# Patient Record
Sex: MALE | Race: WHITE | Employment: FULL TIME | URBAN - METROPOLITAN AREA
[De-identification: names, ages, dates, MRNs, and addresses within clinical notes are randomized per-mention and may not be internally consistent; named-entity substitution may affect disease eponyms.]

---

## 2017-01-05 ENCOUNTER — OFFICE VISIT (OUTPATIENT)
Dept: INTERNAL MEDICINE CLINIC | Age: 67
End: 2017-01-05

## 2017-01-05 VITALS
SYSTOLIC BLOOD PRESSURE: 165 MMHG | DIASTOLIC BLOOD PRESSURE: 102 MMHG | OXYGEN SATURATION: 98 % | WEIGHT: 183 LBS | BODY MASS INDEX: 28.72 KG/M2 | HEART RATE: 86 BPM | HEIGHT: 67 IN

## 2017-01-05 DIAGNOSIS — E16.2 HYPOGLYCEMIA: ICD-10-CM

## 2017-01-05 DIAGNOSIS — D64.9 ANEMIA, UNSPECIFIED TYPE: Primary | ICD-10-CM

## 2017-01-05 DIAGNOSIS — Z12.11 COLON CANCER SCREENING: ICD-10-CM

## 2017-01-05 DIAGNOSIS — R20.9 COLD EXTREMITIES: ICD-10-CM

## 2017-01-05 DIAGNOSIS — Z12.5 SPECIAL SCREENING FOR MALIGNANT NEOPLASM OF PROSTATE: ICD-10-CM

## 2017-01-05 DIAGNOSIS — R55 NEAR SYNCOPE: ICD-10-CM

## 2017-01-05 DIAGNOSIS — Z13.6 SCREENING FOR ISCHEMIC HEART DISEASE: ICD-10-CM

## 2017-01-05 NOTE — PROGRESS NOTES
Chief Complaint   [facility]   [facility]     Hospital Follow up, pt was admitted for low BP.  Medication Refill     metoprolol succinate, check for milligram     he is a 77y.o. year old male who presents for follow-up of ED visit, 12/26/2016-12/28/2016. Pt was admitted to ED for low BP. States that his numbers are elevated but has been feeling well. Is eating three meals a day now which he admits that he was not before. Reviewed and agree with Nurse Note and duplicated in this note. Reviewed PmHx, RxHx, FmHx, SocHx, AllgHx and updated and dated in the chart. No family history on file.     Past Medical History   Diagnosis Date    [facility] ([facility]     Heart failure ([facility]     Hypertension     Type II or unspecified type diabetes mellitus without mention of complication, not stated as uncontrolled     Wears hearing aid 4/2015      Social History     Social History    Marital status: SINGLE     Spouse name: N/A    Number of children: N/A    Years of education: N/A     Social History Main Topics    Smoking status: Former Smoker    Smokeless tobacco: Never Used    Alcohol use 10.5 oz/week     21 Glasses of wine per week    Drug use: Not on file    Sexual activity: Not on file     Other Topics Concern    Not on file     Social History Narrative        Review of Systems - negative except as listed above      Objective:     Vitals:    01/05/17 0934   BP: (!) 165/102   Pulse: 86   SpO2: 98%   Weight: 183 lb (83 kg)   Height: 5' 7\" (1.702 m)       Physical Examination: General appearance - alert, well appearing, and in no distress  Eyes - pupils equal and reactive, extraocular eye movements intact  Ears - bilateral TM's and external ear canals normal  Nose - normal and patent, no erythema, discharge or polyps  Mouth - mucous membranes moist, pharynx normal without lesions  Neck - supple, no significant adenopathy  Chest - clear to auscultation, no wheezes, rales or rhonchi, symmetric air entry  Heart - normal rate, regular rhythm, normal S1, S2, no murmurs, rubs, clicks or gallops  Abdomen - soft, nontender, nondistended, no masses or organomegaly  Neurological - alert, oriented, normal speech, no focal findings or movement disorder noted  Musculoskeletal - no joint tenderness, deformity or swelling  Extremities - peripheral pulses normal, no pedal edema, no clubbing or cyanosis  Skin - normal coloration and turgor, no rashes, no suspicious skin lesions noted    Assessment/ Plan: Lizet Dong was seen today for hypotension and medication refill. Diagnoses and all orders for this visit:    Near syncope  Recheck bp in two weeks for nurse visit  If elevated pt to schedule with cardiology. Routine general medical examination at a health care facility  -     [address] for alcoholism    Hypoglycemia  Continue on current regimen of metformin and adjust as needed in three months  Other specified hypotension    Screening for ischemic heart disease  -     Lipid Panel ([id number])    Special screening for malignant neoplasm of prostate  -     PSA Screening ([id number])    Cold extremities  -     TSH 3RD GENERATION       Follow-up Disposition:  Return in about 3 months (around 4/5/2017) for Diabetes Check, Blood Pressure Check. I have discussed the diagnosis with the patient and the intended plan as seen in the above orders. The patient has received an after-visit summary and questions were answered concerning future plans. Medication Side Effects and Warnings were discussed with patient: yes  Patient Labs were reviewed and or requested: yes  Patient Past Records were reviewed and or requested  yes  I have discussed the diagnosis with the patient and the intended plan as seen in the above orders. The patient has received an after-visit summary and questions were answered concerning future plans.      Pt agrees to call or return to clinic and/or go to closest ER with any worsening of symptoms. This may include, but not limited to increased fever (>100.4) with NSAIDS or Tylenol, increased edema, confusion, rash, worsening of presenting symptoms. 1) Remember to stay active and/or exercise regularly (I suggest 30-45 minutes daily)   2) For reliable dietary information, go to www. EATRIGHT.org. You may wish to consider seeing the nutritionist at [facility] at #[phone] or [phone], also consider the [address] [facility].   3) I routinely suggest a complete physical exam once each year (your birth month)

## 2017-01-05 NOTE — PROGRESS NOTES
This is a Subsequent Medicare Annual Wellness Visit providing Personalized Prevention Plan Services (PPPS) (Performed 12 months after initial AWV and PPPS )    I have reviewed the patient's medical history in detail and updated the computerized patient record. History     Past Medical History   Diagnosis Date    Diabetes ([facility]     Heart failure ([facility]     Hypertension     Type II or unspecified type diabetes mellitus without mention of complication, not stated as uncontrolled     Wears hearing aid 4/2015      Past Surgical History   Procedure Laterality Date    Hx pacemaker       Current Outpatient Prescriptions   Medication Sig Dispense Refill    metFORMIN ER (GLUCOPHAGE XR) 500 mg tablet Take 1 Tab by mouth daily (with dinner). 30 Tab 1    aspirin delayed-release 81 mg tablet Take 81 mg by mouth daily.  furosemide (LASIX) 40 mg tablet Take 20-40 mg by mouth daily.  metoprolol succinate (TOPROL-XL) 25 mg XL tablet Take 1 Tab by mouth daily. 30 Tab 0    tamsulosin (FLOMAX) 0.4 mg capsule Take 1 Cap by mouth daily. 30 Cap 3    magnesium oxide (MAG-OX) 400 mg tablet Take 1 Tab by mouth daily. 30 Tab 3    atorvastatin (LIPITOR) 20 mg tablet Take 1 Tab by mouth daily. 30 Tab 6     No Known Allergies  No family history on file.   Social History   Substance Use Topics    Smoking status: Former Smoker    Smokeless tobacco: Never Used    Alcohol use 10.5 oz/week     21 Glasses of wine per week     Patient Active Problem List   Diagnosis Code    MI ANT/LAT,FIRST EPISODE CARE I21.09    Type II or unspecified type diabetes mellitus without mention of complication, not stated as uncontrolled E11.9    Hyperlipidemia E78.5    Unspecified essential hypertension I10    Ischemic cardiomyopathy I25.5    GIFTY (acute kidney injury) ([facility] N17.9    Urinary retention R33.9    Volume depletion E86.9    Anemia D64.9    DM type 2 (diabetes mellitus, type 2) ([facility] E11.9    Essential hypertension I10    ACP (advance care planning) Z71.89    Hypoglycemia E16.2       Depression Risk Factor Screening:     PHQ 2 / 9, over the last two weeks 7/14/2016   Little interest or pleasure in doing things Not at all   Feeling down, depressed or hopeless Not at all   Total Score PHQ 2 0     Alcohol Risk Factor Screening: On any occasion during the past 3 months, have you had more than 4 drinks containing alcohol? No    Do you average more than 14 drinks per week? No      Functional Ability and Level of Safety:     Hearing Loss   normal-to-mild    Activities of Daily Living   Self-care. Requires assistance with: no ADLs    Fall Risk     Fall Risk Assessment, last 12 mths 7/14/2016   Able to walk? Yes   Fall in past 12 months? No     Abuse Screen   Patient is not abused    Review of Systems   A comprehensive review of systems was negative except for that written in the HPI. Physical Examination     Evaluation of Cognitive Function:  Mood/affect:  happy  Appearance: age appropriate  Family member/caregiver input:     Visit Vitals    BP (!) 165/102 (BP 1 Location: Right arm, BP Patient Position: Sitting)    Pulse 86    Ht 5' 7\" (1.702 m)    Wt 183 lb (83 kg)    SpO2 98%    BMI 28.66 kg/m2     General:  Alert, cooperative, no distress, appears stated age. Head:  Normocephalic, without obvious abnormality, atraumatic. Eyes:  Conjunctivae/corneas clear. PERRL, EOMs intact. Fundi benign   Ears:  Normal TMs and external ear canals both ears. Nose: Nares normal. Septum midline. Mucosa normal. No drainage or sinus tenderness. Throat: Lips, mucosa, and tongue normal. Teeth and gums normal.   Neck: Supple, symmetrical, trachea midline, no adenopathy, thyroid: no enlargement/tenderness/nodules, no carotid bruit and no JVD. Back:   Symmetric, no curvature. ROM normal. No CVA tenderness. Lungs:   Clear to auscultation bilaterally. Chest wall:  No tenderness or deformity.    Heart:  Regular rate and rhythm, S1, S2 normal, no murmur, click, rub or gallop. Abdomen:   Soft, non-tender. Bowel sounds normal. No masses,  No organomegaly. Genitalia:  Normal male without lesion, discharge or tenderness. Rectal:  Normal tone, normal prostate, no masses or tenderness  Guaiac negative stool. Extremities: Extremities normal, atraumatic, no cyanosis or edema. Pulses: 2+ and symmetric all extremities. Skin: Skin color, texture, turgor normal. No rashes or lesions   Lymph nodes: Cervical, supraclavicular, and axillary nodes normal.   Neurologic: CNII-XII intact. Normal strength, sensation and reflexes throughout. Patient Care Team:  Adrián Dupree MD as PCP - General ([facility])  Adrián Dupree MD ([facility])  Katelyn Batres LPN as Nurse Navigator    Advice/Referrals/Counseling   Education and counseling provided:  Are appropriate based on today's review and evaluation  End-of-Life planning (with patient's consent)    Assessment/Plan       ICD-10-CM ICD-9-CM    1. Near syncope R55 780.2    2. Routine general medical examination at a health care facility Z00.00 V70.0 REFERRAL TO GASTROENTEROLOGY   3. Screening for alcoholism Z13.89 V79.1    4. Hypoglycemia E16.2 251.2    5. Other specified hypotension I95.89 458.8    6. Screening for ischemic heart disease Z13.6 V81.0 LIPID PANEL   7. Special screening for malignant neoplasm of prostate Z12.5 V76.44 PSA SCREENING (SCREENING)   8. Cold extremities R68.89 780.99 TSH 3RD GENERATION   .

## 2017-01-09 DIAGNOSIS — I10 ESSENTIAL HYPERTENSION: ICD-10-CM

## 2017-01-09 RX ORDER — METOPROLOL SUCCINATE 25 MG/1
25 TABLET, EXTENDED RELEASE ORAL DAILY
Qty: 30 TAB | Refills: 6 | Status: SHIPPED | OUTPATIENT
Start: 2017-01-09 | End: 2017-07-31 | Stop reason: SDUPTHER

## 2017-02-06 DIAGNOSIS — E78.00 PURE HYPERCHOLESTEROLEMIA: ICD-10-CM

## 2017-02-06 RX ORDER — ATORVASTATIN CALCIUM 20 MG/1
20 TABLET, FILM COATED ORAL DAILY
Qty: 30 TAB | Refills: 6 | Status: SHIPPED | OUTPATIENT
Start: 2017-02-06 | End: 2017-07-31 | Stop reason: SDUPTHER

## 2017-02-06 RX ORDER — ATORVASTATIN CALCIUM 20 MG/1
20 TABLET, FILM COATED ORAL DAILY
Qty: 30 TAB | Refills: 6 | Status: SHIPPED | OUTPATIENT
Start: 2017-02-06 | End: 2017-02-06 | Stop reason: SDUPTHER

## 2017-02-23 ENCOUNTER — OFFICE VISIT (OUTPATIENT)
Dept: INTERNAL MEDICINE CLINIC | Age: 67
End: 2017-02-23

## 2017-02-23 VITALS
BODY MASS INDEX: 29.66 KG/M2 | DIASTOLIC BLOOD PRESSURE: 95 MMHG | RESPIRATION RATE: 14 BRPM | TEMPERATURE: 98.2 F | HEIGHT: 67 IN | WEIGHT: 189 LBS | HEART RATE: 89 BPM | SYSTOLIC BLOOD PRESSURE: 154 MMHG

## 2017-02-23 DIAGNOSIS — R20.9 COLD EXTREMITIES: ICD-10-CM

## 2017-02-23 DIAGNOSIS — N50.89 SCROTAL EDEMA: ICD-10-CM

## 2017-02-23 DIAGNOSIS — I89.0 LYMPHEDEMA: Primary | ICD-10-CM

## 2017-02-23 NOTE — PROGRESS NOTES
Chief Complaint   Patient presents with    Swelling     legs and scrotal     he is a 77y.o. year old male who presents for evaluation of scrotal and leg swelling. He was seen by cardiology two days ago and his lasix was doubled. States that he went to urology for his scrotal edema - \"they didn't do anything\". He has a stress test pending tomorrow. Yesterday he starting leaking fluid from his right leg. Not as bad as it used to be. He is not in any pain. Reviewed and agree with Nurse Note and duplicated in this note. Reviewed PmHx, RxHx, FmHx, SocHx, AllgHx and updated and dated in the chart. No family history on file.     Past Medical History:   Diagnosis Date    [facility]     [facility]     Hypertension     Type II or unspecified type diabetes mellitus without mention of complication, not stated as uncontrolled     Wears hearing aid 4/2015      Social History     Social History    Marital status: SINGLE     Spouse name: N/A    Number of children: N/A    Years of education: N/A     Social History Main Topics    Smoking status: Former Smoker    Smokeless tobacco: Never Used    Alcohol use 10.5 oz/week     21 Glasses of wine per week    Drug use: Not on file    Sexual activity: Not on file     Other Topics Concern    Not on file     Social History Narrative        Review of Systems - negative except as listed above      Objective:     Vitals:    02/23/17 1421   BP: (!) 154/95   Pulse: 89   Resp: 14   Temp: 98.2 °F (36.8 °C)   Weight: 189 lb (85.7 kg)   Height: 5' 7\" (1.702 m)       Physical Examination: General appearance - alert, well appearing, and in no distress  Chest - clear to auscultation, no wheezes, rales or rhonchi, symmetric air entry  Heart - normal rate, regular rhythm, normal S1, S2, no murmurs, rubs, clicks or gallops  Abdomen - soft, nontender, nondistended, no masses or organomegaly  Musculoskeletal - no joint tenderness, deformity or swelling  Extremities - right leg - pedal edema 2 +, feet normal, good pulses, normal color, temperature and sensation - serous fluid leaking  Skin - normal coloration and turgor, no rashes, no suspicious skin lesions noted    Assessment/ Plan: Humphrey Paget was seen today for swelling. Diagnoses and all orders for this visit:    Lymphedema  -     REFERRAL TO WOUND CARE    Scrotal edema  -     REFERRAL TO WOUND CARE    Other orders  -     glucose blood VI test strips (BLOOD GLUCOSE TEST) strip; Check blood sugar three times daily before meals. Yari contour next strips  Diag. 250.00     Follow-up Disposition: Not on File    I have discussed the diagnosis with the patient and the intended plan as seen in the above orders. The patient has received an after-visit summary and questions were answered concerning future plans. Medication Side Effects and Warnings were discussed with patient: yes  Patient Labs were reviewed and or requested: yes  Patient Past Records were reviewed and or requested  yes  I have discussed the diagnosis with the patient and the intended plan as seen in the above orders. The patient has received an after-visit summary and questions were answered concerning future plans. 1) Remember to stay active and/or exercise regularly (I suggest 30-45 minutes daily)   2) For reliable dietary information, go to www. EATRIGHT.org. You may wish to consider seeing the nutritionist at [facility] at #[phone] or [phone], also consider the [address]   3) I routinely suggest a complete physical exam once each year (your birth month)

## 2017-02-24 DIAGNOSIS — E08.9 DIABETES MELLITUS DUE TO UNDERLYING CONDITION WITHOUT COMPLICATION, WITHOUT LONG-TERM CURRENT USE OF INSULIN (HCC): ICD-10-CM

## 2017-02-24 DIAGNOSIS — R74.8 ELEVATED ALKALINE PHOSPHATASE LEVEL: Primary | ICD-10-CM

## 2017-02-24 LAB
ALBUMIN SERPL-MCNC: 3.6 G/DL (ref 3.6–4.8)
ALBUMIN/GLOB SERPL: 1.2 {RATIO} (ref 1.1–2.5)
ALP SERPL-CCNC: 245 IU/L (ref 39–117)
ALT SERPL-CCNC: 17 IU/L (ref 0–44)
AST SERPL-CCNC: 18 IU/L (ref 0–40)
BILIRUB SERPL-MCNC: 0.6 MG/DL (ref 0–1.2)
BUN SERPL-MCNC: 22 MG/DL (ref 8–27)
BUN/CREAT SERPL: 21 (ref 10–22)
CALCIUM SERPL-MCNC: 8.8 MG/DL (ref 8.6–10.2)
CHLORIDE SERPL-SCNC: 95 MMOL/L (ref 96–106)
CO2 SERPL-SCNC: 28 MMOL/L (ref 18–29)
CREAT SERPL-MCNC: 1.05 MG/DL (ref 0.76–1.27)
GLOBULIN SER CALC-MCNC: 3 G/DL (ref 1.5–4.5)
GLUCOSE SERPL-MCNC: 302 MG/DL (ref 65–99)
POTASSIUM SERPL-SCNC: 4.3 MMOL/L (ref 3.5–5.2)
PROT SERPL-MCNC: 6.6 G/DL (ref 6–8.5)
SODIUM SERPL-SCNC: 140 MMOL/L (ref 134–144)
TSH SERPL DL<=0.005 MIU/L-ACNC: 3.59 UIU/ML (ref 0.45–4.5)

## 2017-02-28 RX ORDER — METFORMIN HYDROCHLORIDE 500 MG/1
500 TABLET, EXTENDED RELEASE ORAL
Qty: 90 TAB | Refills: 1 | Status: SHIPPED | OUTPATIENT
Start: 2017-02-28 | End: 2018-02-06 | Stop reason: SDUPTHER

## 2017-03-01 ENCOUNTER — HOSPITAL ENCOUNTER (EMERGENCY)
Age: 67
Discharge: HOME | End: 2017-03-01
Attending: STUDENT IN AN ORGANIZED HEALTH CARE EDUCATION/TRAINING PROGRAM
Payer: COMMERCIAL

## 2017-03-01 ENCOUNTER — APPOINTMENT (OUTPATIENT)
Dept: GENERAL RADIOLOGY | Age: 67
End: 2017-03-01
Attending: STUDENT IN AN ORGANIZED HEALTH CARE EDUCATION/TRAINING PROGRAM
Payer: COMMERCIAL

## 2017-03-01 VITALS
HEART RATE: 89 BPM | WEIGHT: 192 LBS | TEMPERATURE: 97.6 F | BODY MASS INDEX: 30.13 KG/M2 | OXYGEN SATURATION: 95 % | DIASTOLIC BLOOD PRESSURE: 92 MMHG | RESPIRATION RATE: 29 BRPM | SYSTOLIC BLOOD PRESSURE: 153 MMHG | HEIGHT: 67 IN

## 2017-03-01 DIAGNOSIS — I89.0 LYMPHEDEMA: Primary | ICD-10-CM

## 2017-03-01 LAB
ALBUMIN SERPL BCP-MCNC: 3.3 G/DL (ref 3.5–5)
ALBUMIN/GLOB SERPL: 0.8 {RATIO} (ref 1.1–2.2)
ALP SERPL-CCNC: 275 U/L (ref 45–117)
ALT SERPL-CCNC: 19 U/L (ref 12–78)
ANION GAP BLD CALC-SCNC: 7 MMOL/L (ref 5–15)
AST SERPL W P-5'-P-CCNC: 15 U/L (ref 15–37)
ATRIAL RATE: 96 BPM
BASOPHILS # BLD AUTO: 0 K/UL (ref 0–0.1)
BASOPHILS # BLD: 0 % (ref 0–1)
BILIRUB SERPL-MCNC: 0.8 MG/DL (ref 0.2–1)
BNP SERPL-MCNC: 830 PG/ML (ref 0–100)
BUN SERPL-MCNC: 24 MG/DL (ref 6–20)
BUN/CREAT SERPL: 20 (ref 12–20)
CALCIUM SERPL-MCNC: 9 MG/DL (ref 8.5–10.1)
CALCULATED P AXIS, ECG09: -2 DEGREES
CALCULATED R AXIS, ECG10: -122 DEGREES
CALCULATED T AXIS, ECG11: 10 DEGREES
CHLORIDE SERPL-SCNC: 98 MMOL/L (ref 97–108)
CO2 SERPL-SCNC: 30 MMOL/L (ref 21–32)
CREAT SERPL-MCNC: 1.22 MG/DL (ref 0.7–1.3)
DIAGNOSIS, 93000: NORMAL
EOSINOPHIL # BLD: 0 K/UL (ref 0–0.4)
EOSINOPHIL NFR BLD: 1 % (ref 0–7)
ERYTHROCYTE [DISTWIDTH] IN BLOOD BY AUTOMATED COUNT: 17.2 % (ref 11.5–14.5)
GLOBULIN SER CALC-MCNC: 4.4 G/DL (ref 2–4)
GLUCOSE SERPL-MCNC: 259 MG/DL (ref 65–100)
HCT VFR BLD AUTO: 34.9 % (ref 36.6–50.3)
HGB BLD-MCNC: 10.9 G/DL (ref 12.1–17)
LYMPHOCYTES # BLD AUTO: 11 % (ref 12–49)
LYMPHOCYTES # BLD: 0.9 K/UL (ref 0.8–3.5)
MCH RBC QN AUTO: 27 PG (ref 26–34)
MCHC RBC AUTO-ENTMCNC: 31.2 G/DL (ref 30–36.5)
MCV RBC AUTO: 86.6 FL (ref 80–99)
MONOCYTES # BLD: 0.4 K/UL (ref 0–1)
MONOCYTES NFR BLD AUTO: 4 % (ref 5–13)
NEUTS SEG # BLD: 6.8 K/UL (ref 1.8–8)
NEUTS SEG NFR BLD AUTO: 84 % (ref 32–75)
P-R INTERVAL, ECG05: 174 MS
PLATELET # BLD AUTO: 156 K/UL (ref 150–400)
POTASSIUM SERPL-SCNC: 4.1 MMOL/L (ref 3.5–5.1)
PROT SERPL-MCNC: 7.7 G/DL (ref 6.4–8.2)
Q-T INTERVAL, ECG07: 410 MS
QRS DURATION, ECG06: 162 MS
QTC CALCULATION (BEZET), ECG08: 517 MS
RBC # BLD AUTO: 4.03 M/UL (ref 4.1–5.7)
SODIUM SERPL-SCNC: 135 MMOL/L (ref 136–145)
TROPONIN I SERPL-MCNC: <0.04 NG/ML
TROPONIN I SERPL-MCNC: <0.04 NG/ML
VENTRICULAR RATE, ECG03: 96 BPM
WBC # BLD AUTO: 8.1 K/UL (ref 4.1–11.1)

## 2017-03-01 PROCEDURE — 85025 COMPLETE CBC W/AUTO DIFF WBC: CPT | Performed by: STUDENT IN AN ORGANIZED HEALTH CARE EDUCATION/TRAINING PROGRAM

## 2017-03-01 PROCEDURE — 80053 COMPREHEN METABOLIC PANEL: CPT | Performed by: STUDENT IN AN ORGANIZED HEALTH CARE EDUCATION/TRAINING PROGRAM

## 2017-03-01 PROCEDURE — 99284 EMERGENCY DEPT VISIT MOD MDM: CPT

## 2017-03-01 PROCEDURE — 83880 ASSAY OF NATRIURETIC PEPTIDE: CPT | Performed by: STUDENT IN AN ORGANIZED HEALTH CARE EDUCATION/TRAINING PROGRAM

## 2017-03-01 PROCEDURE — 93005 ELECTROCARDIOGRAM TRACING: CPT

## 2017-03-01 PROCEDURE — 96374 THER/PROPH/DIAG INJ IV PUSH: CPT

## 2017-03-01 PROCEDURE — 71020 XR CHEST PA LAT: CPT

## 2017-03-01 PROCEDURE — 36415 COLL VENOUS BLD VENIPUNCTURE: CPT | Performed by: STUDENT IN AN ORGANIZED HEALTH CARE EDUCATION/TRAINING PROGRAM

## 2017-03-01 PROCEDURE — 84484 ASSAY OF TROPONIN QUANT: CPT | Performed by: STUDENT IN AN ORGANIZED HEALTH CARE EDUCATION/TRAINING PROGRAM

## 2017-03-01 PROCEDURE — 74011250636 HC RX REV CODE- 250/636: Performed by: STUDENT IN AN ORGANIZED HEALTH CARE EDUCATION/TRAINING PROGRAM

## 2017-03-01 RX ORDER — FUROSEMIDE 10 MG/ML
60 INJECTION INTRAMUSCULAR; INTRAVENOUS
Status: COMPLETED | OUTPATIENT
Start: 2017-03-01 | End: 2017-03-01

## 2017-03-01 RX ADMIN — FUROSEMIDE 60 MG: 10 INJECTION, SOLUTION INTRAMUSCULAR; INTRAVENOUS at 10:26

## 2017-03-01 NOTE — PROGRESS NOTES
CM met with patient to discuss discharge planning. Patient drove himself from [facility] to the ER due to abdominal discomfort. He does have a defibrillator pacemaker since 2016. He stated that it felt similar to the way he felt when he had his heart attack. He just felt he needed to get it checked out. When I asked him if he was in pain now he stated no. \" If I had felt this good at 7:30 I wouldn't have come in. Patient states he lives alone and does his own cooking and cleaning. He still drives. He has not had any falls in the past 3 months. He does use a cane just because he feels safer when he uses it. Patient last saw his PCP 1 week ago and has his medications filled at [facility]. Patient does have an advanced directive and Patricia Nona is his medical POA. Samuel Phillips RN CRM  Care Management Interventions  PCP Verified by CM: Yes (Dr. Tiburcio Liu)  Last Visit to PCP: 02/28/17  Mode of Transport at Discharge:  Other (see comment) (Car)  Transition of Care Consult (CM Consult): Discharge Planning  MyChart Signup: No  Discharge Durable Medical Equipment: No (Patient does use a cane at times just for support.)  Physical Therapy Consult: No  Occupational Therapy Consult: No  Speech Therapy Consult: No  Current Support Network: Lives Alone  Confirm Follow Up Transport: Self (Car in ER parking lot.)  Plan discussed with Pt/Family/Caregiver: Yes (Patient)  Discharge Location  Discharge Placement: Home

## 2017-03-01 NOTE — ED NOTES
Discharge instructions given to pt by MD.  All questions answered and pt verbalized understanding. V/S stable @ time of discharge. Pt ambulatory out of unit.

## 2017-03-01 NOTE — ED PROVIDER NOTES
HPI Comments: 77 y.o. male with past medical history significant for HTN, DM, heart failure, and s/p AICD placement who presents from [facility] via private vehicle with chief complaint of SOB. Pt reports that he woke up this morning with some epigastric abdominal discomfort. He reports this discomfort was not relieved after eating some cereal. He also reports SOB this morning and that he continued to have SOB and abdominal discomfort throughout attending [facility]. Additionally, pt reports that he has chronic leg swelling that has gotten worse in the last week, and swelling in his \"private area\" that has recently improved. The latter swelling was causing the pt discomfort when walking. Pt is on Lasix but reports that he did not take it this morning as he ran out and was going to get a refill from pharmacy this morning. Pt reports a h/o MI in 2015 and AICD placement in 2016. There are no other acute medical concerns at this time. Social hx: Former smoker. Alcohol use. PCP: [address] [place] [address] MD Roderick    Note written by Jo Graf, as dictated by Nikolay Rivas MD 8:43 AM    The history is provided by the patient. Past Medical History:   Diagnosis Date    [facility] ([facility]     Heart failure ([facility]     Hypertension     Type II or unspecified type diabetes mellitus without mention of complication, not stated as uncontrolled     Wears hearing aid 4/2015       Past Surgical History:   Procedure Laterality Date    HX PACEMAKER           History reviewed. No pertinent family history. Social History     Social History    Marital status: SINGLE     Spouse name: N/A    Number of children: N/A    Years of education: N/A     Occupational History    Not on file.      Social History Main Topics    Smoking status: Former Smoker    Smokeless tobacco: Never Used    Alcohol use 10.5 oz/week     21 Glasses of wine per week    Drug use: Not on file    Sexual activity: Not on file     Other Topics Concern  Not on file     Social History Narrative         ALLERGIES: Review of patient's allergies indicates no known allergies. Review of Systems   Constitutional: Negative for chills, diaphoresis, fatigue and fever. HENT: Negative for congestion, rhinorrhea, sinus pressure, sore throat, trouble swallowing and voice change. Eyes: Negative for photophobia and visual disturbance. Respiratory: Positive for shortness of breath. Negative for cough and chest tightness. Cardiovascular: Positive for leg swelling. Negative for chest pain and palpitations. Gastrointestinal: Positive for abdominal pain. Negative for blood in stool, constipation, diarrhea, nausea and vomiting. Musculoskeletal: Negative for arthralgias, myalgias and neck pain. Neurological: Negative for dizziness, weakness, light-headedness, numbness and headaches. All other systems reviewed and are negative. Vitals:    03/01/17 0756 03/01/17 0911 03/01/17 0914   BP: (!) 174/101 (!) 170/96    Pulse: 99  95   Resp: 16  27   Temp: 98.2 °F (36.8 °C)     SpO2: 96% 94% 96%   Weight: 87.1 kg (192 lb)     Height: 5' 7\" (1.702 m)              Physical Exam   Constitutional: He is oriented to person, place, and time. He appears well-developed and well-nourished. No distress. HENT:   Head: Normocephalic and atraumatic. Nose: Nose normal.   Mouth/Throat: Oropharynx is clear and moist. No oropharyngeal exudate. Eyes: Conjunctivae and EOM are normal. Right eye exhibits no discharge. Left eye exhibits no discharge. No scleral icterus. Neck: Normal range of motion. Neck supple. No JVD present. No tracheal deviation present. No thyromegaly present. Cardiovascular: Normal rate, regular rhythm, normal heart sounds and intact distal pulses. Exam reveals no gallop and no friction rub. No murmur heard. Pulmonary/Chest: Effort normal and breath sounds normal. No stridor. No respiratory distress. He has no wheezes. He has no rales.  He exhibits no tenderness. AICD left upper chest wall that is well healed. Abdominal: Bowel sounds are normal. He exhibits no distension and no mass. There is no tenderness. There is no rebound. Musculoskeletal: Normal range of motion. He exhibits no tenderness. 3+ pitting edema of bilateral lower extremities. Lymphadenopathy:     He has no cervical adenopathy. Neurological: He is alert and oriented to person, place, and time. No cranial nerve deficit. Coordination normal.   Skin: Skin is warm and dry. No rash noted. He is not diaphoretic. No erythema. No pallor. Psychiatric: He has a normal mood and affect. His behavior is normal. Judgment and thought content normal.      Note written by Jo Blair, as dictated by Kenisha Barnard MD 8:50 AM    MDM  Number of Diagnoses or Management Options  Lymphedema:   Diagnosis management comments: Peripheral edema, lymphedema, ACS. 78 y/o male with hx of MI last year with epigastric pain and leg swelling. Will eval with cbc, cmp, cxr, ce, ecg. Will give lasix for edema. Amount and/or Complexity of Data Reviewed  Clinical lab tests: ordered and reviewed  Tests in the radiology section of CPT®: ordered and reviewed  Obtain history from someone other than the patient: yes  Review and summarize past medical records: yes    Risk of Complications, Morbidity, and/or Mortality  Presenting problems: moderate  Diagnostic procedures: moderate  Management options: moderate    Patient Progress  Patient progress: stable    ED Course       Procedures    ED EKG interpretation:  Rhythm: normal sinus rhythm; and regular . Rate (approx.): 96; Axis: normal; ST/T wave: normal; RBBB.    Note written by Jo Blair, as dictated by Kenisha Barnard MD 9:03 AM

## 2017-03-01 NOTE — DISCHARGE INSTRUCTIONS
We hope that we have addressed all of your medical concerns. The examination and treatment you received in the Emergency Department were for an emergent problem and were not intended as complete care. It is important that you follow up with your healthcare provider(s) for ongoing care. If your symptoms worsen or do not improve as expected, and you are unable to reach your usual health care provider(s), you should return to the Emergency Department. Today's healthcare is undergoing tremendous change, and patient satisfaction surveys are one of the many tools to assess the quality of medical care. You may receive a survey from the [employer] regarding your experience in the Emergency Department. I hope that your experience has been completely positive, particularly the medical care that I provided. As such, please participate in the survey; anything less than excellent does not meet my expectations or intentions. [facility] and 8 [facility] participate in nationally recognized quality of care measures. If your blood pressure is greater than 120/80, as reported below, we urge that you seek medical care to address the potential of high blood pressure, commonly known as hypertension. Hypertension can be hereditary or can be caused by certain medical conditions, pain, stress, or \"white coat syndrome. \"       Please make an appointment with your health care provider(s) for follow up of your Emergency Department visit.        VITALS:   Patient Vitals for the past 8 hrs:   Temp Pulse Resp BP SpO2   03/01/17 1030 - 89 20 151/89 93 %   03/01/17 1026 - 85 - 150/87 -   03/01/17 1015 - 89 20 150/87 92 %   03/01/17 1000 - 89 20 148/87 93 %   03/01/17 0945 - 91 19 154/90 91 %   03/01/17 0930 - 92 20 (!) 156/92 92 %   03/01/17 0915 - 94 22 (!) 159/95 93 %   03/01/17 0914 - 95 27 - 96 %   03/01/17 0911 - - - (!) 170/96 94 %   03/01/17 0756 98.2 °F (36.8 °C) 99 16 (!) 174/101 96 %          Thank you for allowing us to provide you with medical care today. We realize that you have many choices for your emergency care needs. Please choose us in the future for any continued health care needs. Roosevelt Guzmán Yolanda, [address] [place] [address].   Office: [phone]            Recent Results (from the past 24 hour(s))   EKG, 12 LEAD, INITIAL    Collection Time: 03/01/17  9:01 AM   Result Value Ref Range    Ventricular Rate 96 BPM    Atrial Rate 96 BPM    P-R Interval 174 ms    QRS Duration 162 ms    Q-T Interval 410 ms    QTC Calculation (Bezet) 517 ms    Calculated P Axis -2 degrees    Calculated R Axis -122 degrees    Calculated T Axis 10 degrees    Diagnosis       Normal sinus rhythm  Right bundle branch block  When compared with ECG of 23-DEC-2016 09:16,  T wave inversion no longer evident in Anterior leads  Confirmed by Lisa Landrum M.D., [facility] ([id number]) on 3/1/2017 12:10:45 PM     CBC WITH AUTOMATED DIFF    Collection Time: 03/01/17  9:11 AM   Result Value Ref Range    WBC 8.1 4.1 - 11.1 K/uL    RBC 4.03 (L) 4.10 - 5.70 M/uL    HGB 10.9 (L) 12.1 - 17.0 g/dL    HCT 34.9 (L) 36.6 - 50.3 %    MCV 86.6 80.0 - 99.0 FL    MCH 27.0 26.0 - 34.0 PG    MCHC 31.2 30.0 - 36.5 g/dL    RDW 17.2 (H) 11.5 - 14.5 %    PLATELET 184 429 - 298 K/uL    NEUTROPHILS 84 (H) 32 - 75 %    LYMPHOCYTES 11 (L) 12 - 49 %    MONOCYTES 4 (L) 5 - 13 %    EOSINOPHILS 1 0 - 7 %    BASOPHILS 0 0 - 1 %    ABS. NEUTROPHILS 6.8 1.8 - 8.0 K/UL    ABS. LYMPHOCYTES 0.9 0.8 - 3.5 K/UL    ABS. MONOCYTES 0.4 0.0 - 1.0 K/UL    ABS. EOSINOPHILS 0.0 0.0 - 0.4 K/UL    ABS.  BASOPHILS 0.0 0.0 - 0.1 K/UL   METABOLIC PANEL, COMPREHENSIVE    Collection Time: 03/01/17  9:11 AM   Result Value Ref Range    Sodium 135 (L) 136 - 145 mmol/L    Potassium 4.1 3.5 - 5.1 mmol/L    Chloride 98 97 - 108 mmol/L    CO2 30 21 - 32 mmol/L    Anion gap 7 5 - 15 mmol/L    Glucose 259 (H) 65 - 100 mg/dL    BUN 24 (H) 6 - 20 MG/DL    Creatinine 1.22 0.70 - 1.30 MG/DL    BUN/Creatinine ratio 20 12 - 20      GFR est AA >60 >60 ml/min/1.73m2    GFR est non-AA 59 (L) >60 ml/min/1.73m2    Calcium 9.0 8.5 - 10.1 MG/DL    Bilirubin, total 0.8 0.2 - 1.0 MG/DL    ALT (SGPT) 19 12 - 78 U/L    AST (SGOT) 15 15 - 37 U/L    Alk. phosphatase 275 (H) 45 - 117 U/L    Protein, total 7.7 6.4 - 8.2 g/dL    Albumin 3.3 (L) 3.5 - 5.0 g/dL    Globulin 4.4 (H) 2.0 - 4.0 g/dL    A-G Ratio 0.8 (L) 1.1 - 2.2     TROPONIN I    Collection Time: 03/01/17  9:11 AM   Result Value Ref Range    Troponin-I, Qt. <0.04 <0.05 ng/mL   BNP    Collection Time: 03/01/17  9:11 AM   Result Value Ref Range     (H) 0 - 100 pg/mL   TROPONIN I    Collection Time: 03/01/17 12:02 PM   Result Value Ref Range    Troponin-I, Qt. <0.04 <0.05 ng/mL       Xr Chest Pa Lat    Result Date: 3/1/2017  EXAM:  XR CHEST PA LAT. INDICATION: Chest pain. COMPARISON: 12/23/2016. FINDINGS: PA and lateral radiographs of the chest were obtained. There is a pacemaker in the left chest. Lungs: The inspiration is shallow and there is mild edema and atelectasis at the lung bases which has increased slightly. Pleura: There are small pleural effusions. Mediastinum: The cardiac and mediastinal contours and pulmonary vascularity are normal.  The aorta is atherosclerotic. Bones and soft tissues: There are degenerative changes of the spine. IMPRESSION: Cardiac pacemaker with shallow inspiration and increased bibasilar atelectasis and small pleural effusions. Lymphedema: Care Instructions  Your Care Instructions  Lymphedema is fluid that builds up in the arms or legs. It is often caused by surgery to remove lymph nodes during cancer treatment, especially breast cancer surgery, which can cause fluid to build up in the arm. It can happen after radiation treatment to an area that involves lymph nodes. It also can be caused by a fractured bone or surgery to fix a fracture.  And some medicines also can cause lymphedema. Some people get it for unknown reasons. Normally, lymph nodes trap bacteria and other substances as fluid flows through them. Then, the white cells in the body's defense, or immune, system can destroy the substances. But if there are few or no lymph nodes--or if the lymph system in an arm or leg has been damaged--fluid can build up in the affected arm or leg. You can take simple steps at home to help treat or prevent fluid buildup. Treatment may include raising the arm or leg to let gravity drain the fluid. You also can wear compression stockings or sleeves. Follow-up care is a key part of your treatment and safety. Be sure to make and go to all appointments, and call your doctor if you are having problems. Its also a good idea to know your test results and keep a list of the medicines you take. How can you care for yourself at home? · Wear a compression stocking or sleeve as your doctor suggests. It can help keep fluid from pooling in an arm or leg. Wear it during air travel. · Prop up the swollen arm or leg on a pillow anytime you sit or lie down. Try to keep it above the level of your heart. This will help reduce swelling. · Avoid crossing your legs if your legs are swollen. · Get some exercise on most days of the week. Increase the intensity of exercise slowly. Water aerobics can help reduce swelling by helping fluid move around. Wear your compression stocking or sleeve during exercise, but not during water exercise. · See a physical therapist. He or she can teach you how to do self-massage to help fluid move around. You also can learn what activities would be best for you. · Keep your feet clean and wear clean socks or stockings every day. Check your feet often for signs of infection, such as redness or heat. Do not walk barefoot.   · If you have had lymph nodes removed from under your arm:  ¨ Do not have blood drawn from the arm on the side of the lymph node surgery. ¨ Do not allow a blood pressure cuff to be placed on that arm. If you are in the hospital, make sure your nurse and other hospital staff know of your condition. ¨ Wear gloves when gardening or doing other activities that may lead to cuts on your fingers or hands. · If you have had lymph nodes removed from your groin:  ¨ Bathe your feet daily in lukewarm, not hot, water. Use a mild soap that has a moisturizer, or use a moisturizer separately. ¨ Check your feet for blisters or cuts. ¨ Wear comfortable and supportive shoes that fit properly. ¨ Wear the correct size of panty hose and stockings. Avoid garters or knee-high or thigh-high stockings. · Ask your doctor how to treat any cuts, scratches, insect bites, or other injuries that may occur. · Use sunscreen and insect repellent when outdoors to protect your skin from sunburn and insect bites. · Wear medical alert jewelry that says you have lymphedema. You can buy these at most [employer] and on the Internet. When should you call for help? Call your doctor now or seek immediate medical care if:  · You have signs of infection, such as:  ¨ Increased pain, swelling, warmth, or redness. ¨ Red streaks leading from the area of lymph node surgery or radiation. ¨ Pus draining from the area of surgery or radiation. ¨ A fever. · You have a feeling of tightness or swelling in or around your arm, hand, leg, or foot. · You have pain, weakness that keeps getting worse, or a \"pins-and-needles\" feeling. Watch closely for changes in your health, and be sure to contact your doctor if:  · You continue to have fluid buildup even with home treatment. Where can you learn more? Go to http://silverio-tesfaye.info/. Enter V398 in the search box to learn more about \"Lymphedema: Care Instructions. \"  Current as of: July 26, 2016  Content Version: 11.1  © 7277-2139 [facility], [employer].  Care instructions adapted under license by [employer] (which disclaims liability or warranty for this information). If you have questions about a medical condition or this instruction, always ask your healthcare professional. [address] any warranty or liability for your use of this information.

## 2017-03-01 NOTE — ED TRIAGE NOTES
TRIAGE: Pt. Arrives c/o SOB and mid abdominal discomfort from Mandaeism. Hx of MI in 2015 and defibrillator placement in 2016. Pt. Currently reports lymphedema in bilateral extremities and penis. Ambulatory in triage. 97% on RA. A&Ox4.

## 2017-03-08 RX ORDER — LANOLIN ALCOHOL/MO/W.PET/CERES
400 CREAM (GRAM) TOPICAL DAILY
Qty: 30 TAB | Refills: 3 | Status: SHIPPED | OUTPATIENT
Start: 2017-03-08 | End: 2017-07-10 | Stop reason: SDUPTHER

## 2017-03-08 RX ORDER — TAMSULOSIN HYDROCHLORIDE 0.4 MG/1
0.4 CAPSULE ORAL DAILY
Qty: 30 CAP | Refills: 3 | Status: SHIPPED | OUTPATIENT
Start: 2017-03-08 | End: 2017-07-10 | Stop reason: SDUPTHER

## 2017-03-15 ENCOUNTER — OFFICE VISIT (OUTPATIENT)
Dept: INTERNAL MEDICINE CLINIC | Age: 67
End: 2017-03-15

## 2017-03-15 VITALS
HEART RATE: 88 BPM | WEIGHT: 179 LBS | TEMPERATURE: 98.2 F | SYSTOLIC BLOOD PRESSURE: 157 MMHG | HEIGHT: 67 IN | BODY MASS INDEX: 28.09 KG/M2 | RESPIRATION RATE: 14 BRPM | DIASTOLIC BLOOD PRESSURE: 88 MMHG

## 2017-03-15 DIAGNOSIS — E08.9 DIABETES MELLITUS DUE TO UNDERLYING CONDITION WITHOUT COMPLICATION, WITHOUT LONG-TERM CURRENT USE OF INSULIN (HCC): Primary | ICD-10-CM

## 2017-03-15 RX ORDER — LISINOPRIL 10 MG/1
TABLET ORAL DAILY
COMMUNITY

## 2017-03-15 NOTE — PROGRESS NOTES
Chief Complaint   Patient presents with    Diabetes     follow-up     he is a 77y.o. year old male who presents for follow-up of diabetes    Diabetes - Pt well controlled on metformin. is checking BS at home. denies hypoglycemia symptoms. denies neuropathy symptoms. Last eye exam in last year. Last foot exam in last year. Provider: Nickie Sin:  Diabetes Report Card   1) Have you seen the eye doctor in past year?yes    2) How would you  rate your Diabetic Diet?fair   3) How well do you take care of your feet?fair   4) Do you keep your Primary Care Follow Up Appts? yes    5) Do you know your A1C goal?yes    6) Do you take your medications daily? yes    7) Do you check your blood sugars? yes    8) Have you gained weight?no    9) Have you lost weight?yes    10) Do you follow an exercise program?no    11) Can you do better? yes            Lab Results   Component Value Date/Time    Hemoglobin A1c 6.7 12/15/2016 10:31 AM     Lab Results   Component Value Date/Time    Cholesterol, total 101 07/14/2016 11:29 AM    HDL Cholesterol 40 07/14/2016 11:29 AM    LDL, calculated 38 07/14/2016 11:29 AM    Triglyceride 115 07/14/2016 11:29 AM    CHOL/HDL Ratio 2.3 05/09/2016 03:40 AM     Lab Results   Component Value Date/Time    Microalb/Creat ratio (ug/mg creat.) 229.0 12/15/2016 10:31 AM         Reviewed and agree with Nurse Note and duplicated in this note. Reviewed PmHx, RxHx, FmHx, SocHx, AllgHx and updated and dated in the chart. No family history on file.     Past Medical History:   Diagnosis Date    Diabetes ([facility]     Heart failure ([facility]     Hypertension     Type II or unspecified type diabetes mellitus without mention of complication, not stated as uncontrolled     Wears hearing aid 4/2015      Social History     Social History    Marital status: SINGLE     Spouse name: N/A    Number of children: N/A    Years of education: N/A     Social History Main Topics    Smoking status: Former Smoker    Smokeless tobacco: Never Used    Alcohol use 10.5 oz/week     21 Glasses of wine per week    Drug use: Not on file    Sexual activity: Not on file     Other Topics Concern    Not on file     Social History Narrative        Review of Systems - negative except as listed above      Objective:     Vitals:    03/15/17 0946   BP: 157/88   Pulse: 88   Resp: 14   Temp: 98.2 °F (36.8 °C)   Weight: 179 lb (81.2 kg)   Height: 5' 7\" (1.702 m)       Physical Examination: General appearance - alert, well appearing, and in no distress  Eyes - pupils equal and reactive, extraocular eye movements intact  Ears - bilateral TM's and external ear canals normal  Nose - normal and patent, no erythema, discharge or polyps  Mouth - mucous membranes moist, pharynx normal without lesions  Neck - supple, no significant adenopathy  Chest - clear to auscultation, no wheezes, rales or rhonchi, symmetric air entry  Heart - normal rate, regular rhythm, normal S1, S2, no murmurs, rubs, clicks or gallops  Abdomen - soft, nontender, nondistended, no masses or organomegaly  Neurological - alert, oriented, normal speech, no focal findings or movement disorder noted  Musculoskeletal - no joint tenderness, deformity or swelling  Extremities - peripheral pulses normal, no pedal edema, no clubbing or cyanosis  Skin - normal coloration and turgor, no rashes, no suspicious skin lesions noted    Assessment/ Plan: Anum Nagy was seen today for diabetes. Diagnoses and all orders for this visit:    Diabetes mellitus due to underlying condition without complication, without long-term current use of insulin (HCC)  -     GGT  -     ALKALINE PHOSPHATASE, BONE  -     HEMOGLOBIN A1C WITH EAG       Follow-up Disposition:  Return in about 3 months (around 6/15/2017) for Diabetes Check. I have discussed the diagnosis with the patient and the intended plan as seen in the above orders.   The patient has received an after-visit summary and questions were answered concerning future plans. Medication Side Effects and Warnings were discussed with patient: yes  Patient Labs were reviewed and or requested: yes  Patient Past Records were reviewed and or requested  yes  I have discussed the diagnosis with the patient and the intended plan as seen in the above orders. The patient has received an after-visit summary and questions were answered concerning future plans. Pt agrees to call or return to clinic and/or go to closest ER with any worsening of symptoms. This may include, but not limited to increased fever (>100.4) with NSAIDS or Tylenol, increased edema, confusion, rash, worsening of presenting symptoms. Patient was informed/counseled to:    1) Remember to stay active and/or exercise regularly (I suggest 30-45 minutes daily)   2) For reliable dietary information, go to www. [employer]AT[employer].org. You may wish to consider seeing the nutritionist at [facility] at #[phone] or [phone], also consider the [address]   3) I routinely suggest a complete physical exam once each year (your birth month)

## 2017-03-15 NOTE — MR AVS SNAPSHOT
Visit Information Date & Time Provider Department Dept. Phone Encounter #  
 3/15/2017  9:30 AM John Rodriguez MD [facility] [place] [id number] Upcoming Health Maintenance Date Due FOBT Q 1 YEAR AGE 50-75 4/28/2000 ZOSTER VACCINE AGE 60> 4/28/2010 DTaP/Tdap/Td series (1 - Tdap) 6/17/2010 FOOT EXAM Q1 1/26/2017 HEMOGLOBIN A1C Q6M 6/15/2017 LIPID PANEL Q1 7/14/2017 EYE EXAM RETINAL OR DILATED Q1 10/4/2017 MICROALBUMIN Q1 12/15/2017 MEDICARE YEARLY EXAM 3/16/2018 Pneumococcal 65+ High/Highest Risk (2 of 2 - PPSV23) 6/16/2018 GLAUCOMA SCREENING Q2Y 10/4/2018 Allergies as of 3/15/2017  Review Complete On: 3/15/2017 By: John Rodriguez MD  
 No Known Allergies Current Immunizations  Reviewed on 12/27/2016 Name Date Hep A Vaccine 6/16/2013 Hep B Vaccine 6/16/2013 Hep C Vaccine 6/16/2013 Influenza Vaccine 9/16/2014 Influenza Vaccine Whole 10/30/2011 Pneumococcal Vaccine (Unspecified Type) 6/16/2013, 2/2/2012  1:46 PM  
 Td 6/16/2010 Not reviewed this visit You Were Diagnosed With   
  
 Codes Comments Diabetes mellitus due to underlying condition without complication, without long-term current use of insulin ([facility]    -  Primary ICD-10-CM: E08.9 ICD-9-CM: 249.00 Vitals BP Pulse Temp Resp Height(growth percentile) Weight(growth percentile) 157/88 88 98.2 °F (36.8 °C) 14 5' 7\" (1.702 m) 179 lb (81.2 kg) BMI Smoking Status 28.04 kg/m2 Former Smoker BMI and BSA Data Body Mass Index Body Surface Area 28.04 kg/m 2 1.96 m 2 Preferred Pharmacy Pharmacy Name Phone [facility] [phone] Your Updated Medication List  
  
   
This list is accurate as of: 3/15/17 10:32 AM.  Always use your most recent med list.  
  
  
  
  
 aspirin delayed-release 81 mg tablet Take 81 mg by mouth daily. atorvastatin 20 mg tablet Commonly known as:  LIPITOR Take 1 Tab by mouth daily. furosemide 40 mg tablet Commonly known as:  LASIX Take 20-40 mg by mouth daily. glucose blood VI test strips strip Commonly known as:  blood glucose test  
Check blood sugar three times daily before meals. Yari contour next strips  Diag. 250.00  
  
 lisinopril 10 mg tablet Commonly known as:  Rosi Escort Take  by mouth daily. magnesium oxide 400 mg tablet Commonly known as:  MAG-OX Take 1 Tab by mouth daily. metFORMIN  mg tablet Commonly known as:  GLUCOPHAGE XR Take 1 Tab by mouth daily (with dinner). metoprolol succinate 25 mg XL tablet Commonly known as:  TOPROL-XL Take 1 Tab by mouth daily. tamsulosin 0.4 mg capsule Commonly known as:  FLOMAX Take 1 Cap by mouth daily. We Performed the Following ALKALINE PHOSPHATASE, BONE [31540 CPT(R)] GGT [32635 CPT(R)] HEMOGLOBIN A1C WITH EAG [70669 CPT(R)] Introducing [facility] SERVICES! Dear Lucila Vides: 
Thank you for requesting a MST account. Our records indicate that you already have an active MST account. You can access your account anytime at [email or web address] Did you know that you can access your hospital and ER discharge instructions at any time in MST? You can also review all of your test results from your hospital stay or ER visit. Additional Information If you have questions, please visit the Frequently Asked Questions section of the MST website at [email or web address] Remember, MST is NOT to be used for urgent needs. For medical emergencies, dial 911. Now available from your iPhone and Android! Please provide this summary of care documentation to your next provider. Your primary care clinician is listed as Sandrine Will.  If you have any questions after today's visit, please call [phone].

## 2017-03-17 DIAGNOSIS — E11.9 DIABETES MELLITUS WITHOUT COMPLICATION (HCC): Primary | ICD-10-CM

## 2017-03-17 DIAGNOSIS — R74.8 ELEVATED SERUM GGT LEVEL: ICD-10-CM

## 2017-03-17 LAB
ALP BONE SERPL-MCNC: 26.3 UG/L (ref 7.6–25.1)
EST. AVERAGE GLUCOSE BLD GHB EST-MCNC: 214 MG/DL
GGT SERPL-CCNC: 253 IU/L (ref 0–65)
HBA1C MFR BLD: 9.1 % (ref 4.8–5.6)

## 2017-03-17 RX ORDER — METFORMIN HYDROCHLORIDE 500 MG/1
500 TABLET, EXTENDED RELEASE ORAL 2 TIMES DAILY
Qty: 30 TAB | Refills: 2 | Status: SHIPPED | OUTPATIENT
Start: 2017-03-17 | End: 2017-05-11 | Stop reason: SDUPTHER

## 2017-03-21 ENCOUNTER — TELEPHONE (OUTPATIENT)
Dept: INTERNAL MEDICINE CLINIC | Age: 67
End: 2017-03-21

## 2017-05-12 RX ORDER — METFORMIN HYDROCHLORIDE 500 MG/1
500 TABLET, EXTENDED RELEASE ORAL 2 TIMES DAILY
Qty: 180 TAB | Refills: 2 | Status: SHIPPED | OUTPATIENT
Start: 2017-05-12 | End: 2017-07-31 | Stop reason: SDUPTHER

## 2017-07-10 RX ORDER — LANOLIN ALCOHOL/MO/W.PET/CERES
400 CREAM (GRAM) TOPICAL DAILY
Qty: 30 TAB | Refills: 5 | Status: SHIPPED | OUTPATIENT
Start: 2017-07-10 | End: 2018-01-15 | Stop reason: SDUPTHER

## 2017-07-10 RX ORDER — TAMSULOSIN HYDROCHLORIDE 0.4 MG/1
0.4 CAPSULE ORAL DAILY
Qty: 30 CAP | Refills: 5 | Status: SHIPPED | OUTPATIENT
Start: 2017-07-10 | End: 2018-01-15 | Stop reason: SDUPTHER

## 2017-07-10 NOTE — TELEPHONE ENCOUNTER
Patient contacted the office requesting medication refills on Tamsulosin 0.4mg caps and Magnesium Oxide 400mg tabs. Pharmacy verified.     Last Office Visit 03/15/2017  Follow Up Appointment Scheduled on 07/31/2017

## 2017-07-31 ENCOUNTER — OFFICE VISIT (OUTPATIENT)
Dept: INTERNAL MEDICINE CLINIC | Age: 67
End: 2017-07-31

## 2017-07-31 VITALS
HEART RATE: 70 BPM | OXYGEN SATURATION: 97 % | TEMPERATURE: 97.8 F | BODY MASS INDEX: 23.54 KG/M2 | RESPIRATION RATE: 16 BRPM | SYSTOLIC BLOOD PRESSURE: 150 MMHG | HEIGHT: 67 IN | DIASTOLIC BLOOD PRESSURE: 79 MMHG | WEIGHT: 150 LBS

## 2017-07-31 DIAGNOSIS — E11.9 DIABETES MELLITUS WITHOUT COMPLICATION (HCC): Primary | ICD-10-CM

## 2017-07-31 DIAGNOSIS — E78.00 PURE HYPERCHOLESTEROLEMIA: ICD-10-CM

## 2017-07-31 DIAGNOSIS — I10 ESSENTIAL HYPERTENSION: ICD-10-CM

## 2017-07-31 RX ORDER — ATORVASTATIN CALCIUM 20 MG/1
20 TABLET, FILM COATED ORAL DAILY
Qty: 30 TAB | Refills: 6 | Status: SHIPPED | OUTPATIENT
Start: 2017-07-31

## 2017-07-31 RX ORDER — METOPROLOL SUCCINATE 25 MG/1
25 TABLET, EXTENDED RELEASE ORAL DAILY
Qty: 30 TAB | Refills: 6 | Status: SHIPPED | OUTPATIENT
Start: 2017-07-31 | End: 2018-03-13 | Stop reason: SDUPTHER

## 2017-07-31 NOTE — PROGRESS NOTES
Chief Complaint   Patient presents with    Diabetes    Hypertension    Follow-up    Medication Refill     Medication Pending     he is a 79y.o. year old male who presents for follow-up of Diabetes and Hypertension. Per patient insurance is changing to medicare, he is in the process of retiring. Would like to discuss when to take glucose tabs? Diabetes - Pt well controlled on Metformin 500mg twice daily. is checking BS at home. denies hypoglycemia symptoms. denies neuropathy symptoms. Last eye exam Follow Up Appointment schedule for September 2017. Last foot exam Few Months ago. Provider:   Questionaire:  Diabetes Report Card   1) Have you seen the eye doctor in past year?yes    2) How would you  rate your Diabetic Diet? No   3) How well do you take care of your feet? Yes   4) Do you keep your Primary Care Follow Up Appts? yes    5) Do you know your A1C goal?yes    6) Do you take your medications daily?no    7) Do you check your blood sugars? Yes   8) Have you gained weight?no    9) Have you lost weight?yes    10) Do you follow an exercise program?yes    11) Can you do better? no            Lab Results   Component Value Date/Time    Hemoglobin A1c 9.1 03/15/2017 10:21 AM     Lab Results   Component Value Date/Time    Cholesterol, total 101 07/14/2016 11:29 AM    HDL Cholesterol 40 07/14/2016 11:29 AM    LDL, calculated 38 07/14/2016 11:29 AM    Triglyceride 115 07/14/2016 11:29 AM    CHOL/HDL Ratio 2.3 05/09/2016 03:40 AM     Lab Results   Component Value Date/Time    Microalb/Creat ratio (ug/mg creat.) 229.0 12/15/2016 10:31 AM         Reviewed and agree with Nurse Note and duplicated in this note. Reviewed PmHx, RxHx, FmHx, SocHx, AllgHx and updated and dated in the chart. No family history on file.     Past Medical History:   Diagnosis Date    [facility] [address]     Heart failure ([facility] [address]     Hypertension     Type II or unspecified type diabetes mellitus without mention of complication, not stated as uncontrolled     Wears hearing aid 4/2015      Social History     Social History    Marital status: SINGLE     Spouse name: N/A    Number of children: N/A    Years of education: N/A     Social History Main Topics    Smoking status: Former Smoker    Smokeless tobacco: Never Used    Alcohol use 10.5 oz/week     21 Glasses of wine per week    Drug use: Not on file    Sexual activity: Not on file     Other Topics Concern    Not on file     Social History Narrative        Review of Systems - negative except as listed above      Objective:     Vitals:    07/31/17 1025   BP: 150/79   Pulse: 70   Resp: 16   Temp: 97.8 °F (36.6 °C)   TempSrc: Oral   SpO2: 97%   Weight: 150 lb (68 kg)   Height: 5' 7\" (1.702 m)       Physical Examination: General appearance - alert, well appearing, and in no distress  Eyes - pupils equal and reactive, extraocular eye movements intact  Ears - bilateral TM's and external ear canals normal  Nose - normal and patent, no erythema, discharge or polyps  Mouth - mucous membranes moist, pharynx normal without lesions  Neck - supple, no significant adenopathy  Chest - clear to auscultation, no wheezes, rales or rhonchi, symmetric air entry  Heart - normal rate, regular rhythm, normal S1, S2, no murmurs, rubs, clicks or gallops  Abdomen - soft, nontender, nondistended, no masses or organomegaly  Neurological - alert, oriented, normal speech, no focal findings or movement disorder noted  Musculoskeletal - no joint tenderness, deformity or swelling  Extremities - peripheral pulses normal, no pedal edema, no clubbing or cyanosis, monofilament sensory exam is normal in both feet  Skin - normal coloration and turgor, no rashes, no suspicious skin lesions noted    Assessment/ Plan:   Diagnoses and all orders for this visit:    1. Diabetes mellitus without complication ([address]  -      DIABETES FOOT EXAM  -     LIPID PANEL  -     HEMOGLOBIN A1C WITH EAG  -     METABOLIC PANEL, COMPREHENSIVE    2. Essential hypertension  -     metoprolol succinate (TOPROL-XL) 25 mg XL tablet; Take 1 Tab by mouth daily. 3. Pure hypercholesterolemia  -     atorvastatin (LIPITOR) 20 mg tablet; Take 1 Tab by mouth daily. Patient will be on Medicare at next visit, will do his welcome to Medicare physical at that point  Follow-up Disposition:  Return in about 3 months (around 10/31/2017) for Diabetes Check. I have discussed the diagnosis with the patient and the intended plan as seen in the above orders. The patient has received an after-visit summary and questions were answered concerning future plans. Medication Side Effects and Warnings were discussed with patient: yes  Patient Labs were reviewed and or requested: yes  Patient Past Records were reviewed and or requested  yes  I have discussed the diagnosis with the patient and the intended plan as seen in the above orders. The patient has received an after-visit summary and questions were answered concerning future plans. Pt agrees to call or return to clinic and/or go to closest ER with any worsening of symptoms. This may include, but not limited to increased fever (>100.4) with NSAIDS or Tylenol, increased edema, confusion, rash, worsening of presenting symptoms. Patient was informed/counseled to:    1) Remember to stay active and/or exercise regularly (I suggest 30-45 minutes daily)   2) For reliable dietary information, go to www. EATRIGHT.org. You may wish to consider seeing the nutritionist at [facility] at #[phone] or [phone], also consider the [address]   3) I routinely suggest a complete physical exam once each year (your birth month)

## 2017-07-31 NOTE — MR AVS SNAPSHOT
Visit Information Date & Time Provider Department Dept. Phone Encounter #  
 7/31/2017 10:00 AM [address] [place] [address] MD Roderick [facility] [id number] Follow-up Instructions Return in about 3 months (around 10/31/2017) for Diabetes Check. Follow-up and Disposition History Upcoming Health Maintenance Date Due FOBT Q 1 YEAR AGE 50-75 4/28/2000 ZOSTER VACCINE AGE 60> 2/28/2010 DTaP/Tdap/Td series (1 - Tdap) 6/17/2010 FOOT EXAM Q1 1/26/2017 LIPID PANEL Q1 7/14/2017 INFLUENZA AGE 9 TO ADULT 8/1/2017 HEMOGLOBIN A1C Q6M 9/15/2017 EYE EXAM RETINAL OR DILATED Q1 10/4/2017 MICROALBUMIN Q1 12/15/2017 MEDICARE YEARLY EXAM 3/16/2018 Pneumococcal 65+ Low/Medium Risk (2 of 2 - PPSV23) 6/16/2018 GLAUCOMA SCREENING Q2Y 10/4/2018 Allergies as of 7/31/2017  Review Complete On: 7/31/2017 By: [address] [place] [address] MD Roderick  
 No Known Allergies Current Immunizations  Reviewed on 12/27/2016 Name Date Hep A Vaccine 6/16/2013 Hep B Vaccine 6/16/2013 Hep C Vaccine 6/16/2013 Influenza Vaccine 9/16/2014 Influenza Vaccine Whole 10/30/2011 Pneumococcal Vaccine (Unspecified Type) 6/16/2013 Td 6/16/2010 ZZZ-RETIRED (DO NOT USE) Pneumococcal Vaccine (Unspecified Type) 2/2/2012  1:46 PM  
  
 Not reviewed this visit You Were Diagnosed With   
  
 Codes Comments Diabetes mellitus without complication ([facility]    -  Primary ICD-10-CM: E11.9 ICD-9-CM: 250.00 Essential hypertension     ICD-10-CM: I10 
ICD-9-CM: 401.9 Pure hypercholesterolemia     ICD-10-CM: E78.00 ICD-9-CM: 272.0 Vitals BP Pulse Temp Resp Height(growth percentile) Weight(growth percentile) 150/79 70 97.8 °F (36.6 °C) (Oral) 16 5' 7\" (1.702 m) 150 lb (68 kg) SpO2 BMI Smoking Status 97% 23.49 kg/m2 Former Smoker Vitals History BMI and BSA Data  Body Mass Index Body Surface Area  
 23.49 kg/m 2 1.79 m 2  
  
  
 Preferred Pharm[facility] [place] [address] [phone] Your Updated Medication List  
  
   
This list is accurate as of: 7/31/17 10:47 AM.  Always use your most recent med list.  
  
  
  
  
 aspirin delayed-release 81 mg tablet Take 81 mg by mouth daily. atorvastatin 20 mg tablet Commonly known as:  LIPITOR Take 1 Tab by mouth daily. furosemide 40 mg tablet Commonly known as:  LASIX Take 20-40 mg by mouth daily. glucose blood VI test strips strip Commonly known as:  blood glucose test  
Check blood sugar three times daily before meals. Yari contour next strips  Diag. 250.00  
  
 lisinopril 10 mg tablet Commonly known as:  Hannah Galatia Take  by mouth daily. magnesium oxide 400 mg tablet Commonly known as:  MAG-OX Take 1 Tab by mouth daily. metFORMIN  mg tablet Commonly known as:  GLUCOPHAGE XR Take 1 Tab by mouth daily (with dinner). metoprolol succinate 25 mg XL tablet Commonly known as:  TOPROL-XL Take 1 Tab by mouth daily. tamsulosin 0.4 mg capsule Commonly known as:  FLOMAX Take 1 Cap by mouth daily. Prescriptions Sent to Pharmacy Refills  
 metoprolol succinate (TOPROL-XL) 25 mg XL tablet 6 Sig: Take 1 Tab by mouth daily. Class: Normal  
 Pharmacy: [place], [address] Ph #: [phone] Route: Oral  
 atorvastatin (LIPITOR) 20 mg tablet 6 Sig: Take 1 Tab by mouth daily. Class: Normal  
 Pharmacy: [place], [address] Ph #: [phone] Route: Oral  
  
We Performed the Following HEMOGLOBIN A1C WITH EAG [78862 CPT(R)]  DIABETES FOOT EXAM [7 Custom] LIPID PANEL [94192 CPT(R)] METABOLIC PANEL, COMPREHENSIVE [27052 CPT(R)] Follow-up Instructions Return in about 3 months (around 10/31/2017) for Diabetes Check. Introducing [facility] & HEALTH SERVICES! Dear Mahala Harada: 
Thank you for requesting a [employer] account. Our records indicate that you already have an active [employer] account. You can access your account anytime at [email or web address] Did you know that you can access your hospital and ER discharge instructions at any time in [employer]? You can also review all of your test results from your hospital stay or ER visit. Additional Information If you have questions, please visit the Frequently Asked Questions section of the [employer] website at [email or web address] Remember, [employer] is NOT to be used for urgent needs. For medical emergencies, dial 911. Now available from your iPhone and Android! Please provide this summary of care documentation to your next provider. Your primary care clinician is listed as Tobias Titus. If you have any questions after today's visit, please call [phone].

## 2017-09-27 ENCOUNTER — OFFICE VISIT (OUTPATIENT)
Dept: INTERNAL MEDICINE CLINIC | Age: 67
End: 2017-09-27

## 2017-09-27 VITALS
HEIGHT: 67 IN | HEART RATE: 69 BPM | SYSTOLIC BLOOD PRESSURE: 126 MMHG | WEIGHT: 147 LBS | BODY MASS INDEX: 23.07 KG/M2 | OXYGEN SATURATION: 100 % | DIASTOLIC BLOOD PRESSURE: 68 MMHG | RESPIRATION RATE: 18 BRPM | TEMPERATURE: 98 F

## 2017-09-27 DIAGNOSIS — E78.5 HYPERLIPIDEMIA, UNSPECIFIED HYPERLIPIDEMIA TYPE: ICD-10-CM

## 2017-09-27 DIAGNOSIS — Z12.11 COLON CANCER SCREENING: ICD-10-CM

## 2017-09-27 DIAGNOSIS — Z01.818 PRE-OP EVALUATION: Primary | ICD-10-CM

## 2017-09-27 DIAGNOSIS — R73.9 ELEVATED BLOOD SUGAR: ICD-10-CM

## 2017-09-27 NOTE — PROGRESS NOTES
Chief Complaint   Patient presents with    Pre-op Exam     Cataract extraction w/ lens     he is a 79y.o. year old male who presents for evaluation of Pre op for Cataract extraction w/ lens Implant. Patient does not have any questions for the provider at this time. Vitals unremarkable. Surgery date 10/26/17    Reviewed and agree with Nurse Note and duplicated in this note. Reviewed PmHx, RxHx, FmHx, SocHx, AllgHx and updated and dated in the chart. No family history on file.     Past Medical History:   Diagnosis Date    [facility] [address]     Heart failure ([facility] [address]     Hypertension     Type II or unspecified type diabetes mellitus without mention of complication, not stated as uncontrolled     Wears hearing aid 4/2015      Social History     Social History    Marital status: SINGLE     Spouse name: N/A    Number of children: N/A    Years of education: N/A     Social History Main Topics    Smoking status: Former Smoker    Smokeless tobacco: Never Used    Alcohol use 10.5 oz/week     21 Glasses of wine per week    Drug use: Not on file    Sexual activity: Not on file     Other Topics Concern    Not on file     Social History Narrative        Review of Systems - negative except as listed above      Objective:     Vitals:    09/27/17 1014   BP: 126/68   Pulse: 69   Resp: 18   Temp: 98 °F (36.7 °C)   TempSrc: Oral   SpO2: 100%   Weight: 147 lb (66.7 kg)   Height: 5' 7\" (1.702 m)       Physical Examination: General appearance - alert, well appearing, and in no distress  Eyes - pupils equal and reactive, extraocular eye movements intact  Ears - bilateral TM's and external ear canals normal  Nose - normal and patent, no erythema, discharge or polyps  Mouth - mucous membranes moist, pharynx normal without lesions  Neck - supple, no significant adenopathy  Chest -  no rales or rhonchi, symmetric air entry, wheezing noted upper lobes  Heart - normal rate, regular rhythm, normal S1, S2, no murmurs, rubs, clicks or gallops  Abdomen - soft, nontender, nondistended, no masses or organomegaly  Back exam - full range of motion, no tenderness, palpable spasm or pain on motion  Neurological - alert, oriented, normal speech, no focal findings or movement disorder noted  Musculoskeletal - no joint tenderness, deformity or swelling  Extremities - peripheral pulses normal, no pedal edema, no clubbing or cyanosis  Skin - normal coloration and turgor, no rashes, no suspicious skin lesions noted    Assessment/ Plan:   Diagnoses and all orders for this visit:    1. Pre-op evaluation  -     XR CHEST PA LAT; Future    2. Hyperlipidemia, unspecified hyperlipidemia type  -     LIPID PANEL    3. Elevated blood sugar  -     HEMOGLOBIN A1C WITH EAG    4. Colon cancer screening  -     OCCULT BLOOD, IMMUNOASSAY (FIT)     Follow-up Disposition: Not on File    I have discussed the diagnosis with the patient and the intended plan as seen in the above orders. The patient has received an after-visit summary and questions were answered concerning future plans. Medication Side Effects and Warnings were discussed with patient: yes  Patient Labs were reviewed and or requested: yes  Patient Past Records were reviewed and or requested  yes  I have discussed the diagnosis with the patient and the intended plan as seen in the above orders. The patient has received an after-visit summary and questions were answered concerning future plans. Pt agrees to call or return to clinic and/or go to closest ER with any worsening of symptoms. This may include, but not limited to increased fever (>100.4) with NSAIDS or Tylenol, increased edema, confusion, rash, worsening of presenting symptoms. 1) Remember to stay active and/or exercise regularly (I suggest 30-45 minutes daily)   2) For reliable dietary information, go to www. EATRIGHT.org. You may wish to consider seeing the nutritionist at [facility] at #[phone] or [phone], also consider the Mediterranean diet.   3) I routinely suggest a complete physical exam once each year (your birth month)

## 2017-09-27 NOTE — MR AVS SNAPSHOT
Visit Information Date & Time Provider Department Dept. Phone Encounter #  
 9/27/2017  9:45 AM Stacy Wills MD [facility] Sports Medicine and [place] [address] [id number] Follow-up Instructions Return in about 3 months (around 12/27/2017) for Diabetes Check. Follow-up and Disposition History Your Appointments 11/1/2017 10:00 AM  
Any with Stacy Wills MD  
[address] [place] [address] and Primary Care [facility]) Appt Note: 3 month follow up  
 [address] [facility]  
  
   
 [address] [id number] Upcoming Health Maintenance Date Due FOBT Q 1 YEAR AGE 50-75 4/28/2000 ZOSTER VACCINE AGE 60> 2/28/2010 DTaP/Tdap/Td series (1 - Tdap) 6/17/2010 LIPID PANEL Q1 7/14/2017 INFLUENZA AGE 9 TO ADULT 8/1/2017 HEMOGLOBIN A1C Q6M 9/15/2017 EYE EXAM RETINAL OR DILATED Q1 10/4/2017 MICROALBUMIN Q1 12/15/2017 MEDICARE YEARLY EXAM 3/16/2018 Pneumococcal 65+ Low/Medium Risk (2 of 2 - PPSV23) 6/16/2018 FOOT EXAM Q1 7/31/2018 GLAUCOMA SCREENING Q2Y 10/4/2018 Allergies as of 9/27/2017  Review Complete On: 9/27/2017 By: Stacy Wills MD  
 No Known Allergies Current Immunizations  Reviewed on 12/27/2016 Name Date Hep A Vaccine 6/16/2013 Hep B Vaccine 6/16/2013 Hep C Vaccine 6/16/2013 Influenza Vaccine 9/16/2014 Influenza Vaccine Whole 10/30/2011 Pneumococcal Vaccine (Unspecified Type) 6/16/2013 Td 6/16/2010 ZZZ-RETIRED (DO NOT USE) Pneumococcal Vaccine (Unspecified Type) 2/2/2012  1:46 PM  
  
 Not reviewed this visit You Were Diagnosed With   
  
 Codes Comments Pre-op evaluation    -  Primary ICD-10-CM: S60.615 ICD-9-CM: V72.84 Hyperlipidemia, unspecified hyperlipidemia type     ICD-10-CM: E78.5 ICD-9-CM: 272.4 Elevated blood sugar     ICD-10-CM: R73.9 ICD-9-CM: 790.29 Colon cancer screening     ICD-10-CM: Z12.11 ICD-9-CM: V76.51   
 Vitals BP Pulse Temp Resp Height(growth percentile) Weight(growth percentile) 126/68 (BP 1 Location: Right arm, BP Patient Position: Sitting) 69 98 °F (36.7 °C) (Oral) 18 5' 7\" (1.702 m) 147 lb (66.7 kg) SpO2 BMI Smoking Status 100% 23.02 kg/m2 Former Smoker BMI and BSA Data Body Mass Index Body Surface Area 23.02 kg/m 2 1.78 m 2 Preferred Pharmacy Pharmacy Name Phone [facility] [phone] Your Updated Medication List  
  
   
This list is accurate as of: 9/27/17 11:23 AM.  Always use your most recent med list.  
  
  
  
  
 aspirin delayed-release 81 mg tablet Take 81 mg by mouth daily. atorvastatin 20 mg tablet Commonly known as:  LIPITOR Take 1 Tab by mouth daily. furosemide 40 mg tablet Commonly known as:  LASIX Take 20-40 mg by mouth daily. glucose blood VI test strips strip Commonly known as:  blood glucose test  
Check blood sugar three times daily before meals. Yari contour next strips  Diag. 250.00  
  
 lisinopril 10 mg tablet Commonly known as:  Therisa Semen Take  by mouth daily. magnesium oxide 400 mg tablet Commonly known as:  MAG-OX Take 1 Tab by mouth daily. metFORMIN  mg tablet Commonly known as:  GLUCOPHAGE XR Take 1 Tab by mouth daily (with dinner). metoprolol succinate 25 mg XL tablet Commonly known as:  TOPROL-XL Take 1 Tab by mouth daily. tamsulosin 0.4 mg capsule Commonly known as:  FLOMAX Take 1 Cap by mouth daily. We Performed the Following HEMOGLOBIN A1C WITH EAG [20200 CPT(R)] LIPID PANEL [16764 CPT(R)] OCCULT BLOOD, IMMUNOASSAY (FIT) [id number] CPT(R)] Follow-up Instructions Return in about 3 months (around 12/27/2017) for Diabetes Check. To-Do List   
 09/27/2017 Imaging:  XR CHEST PA LAT Introducing [facility] & HEALTH SERVICES! Dear You Like: Thank you for requesting a [employer] account. Our records indicate that you already have an active [employer] account. You can access your account anytime at [email or web address] Did you know that you can access your hospital and ER discharge instructions at any time in [employer]? You can also review all of your test results from your hospital stay or ER visit. Additional Information If you have questions, please visit the Frequently Asked Questions section of the [employer] website at [email or web address] Remember, [employer] is NOT to be used for urgent needs. For medical emergencies, dial 911. Now available from your iPhone and Android! Please provide this summary of care documentation to your next provider. Your primary care clinician is listed as Heriberto Huerta. If you have any questions after today's visit, please call [phone].

## 2017-09-28 LAB
CHOLEST SERPL-MCNC: 130 MG/DL (ref 100–199)
EST. AVERAGE GLUCOSE BLD GHB EST-MCNC: 148 MG/DL
HBA1C MFR BLD: 6.8 % (ref 4.8–5.6)
HDLC SERPL-MCNC: 46 MG/DL
LDLC SERPL CALC-MCNC: 67 MG/DL (ref 0–99)
TRIGL SERPL-MCNC: 83 MG/DL (ref 0–149)
VLDLC SERPL CALC-MCNC: 17 MG/DL (ref 5–40)

## 2018-01-15 RX ORDER — LANOLIN ALCOHOL/MO/W.PET/CERES
400 CREAM (GRAM) TOPICAL DAILY
Qty: 30 TAB | Refills: 5 | Status: SHIPPED | OUTPATIENT
Start: 2018-01-15 | End: 2018-07-17 | Stop reason: SDUPTHER

## 2018-01-15 RX ORDER — TAMSULOSIN HYDROCHLORIDE 0.4 MG/1
0.4 CAPSULE ORAL DAILY
Qty: 30 CAP | Refills: 5 | Status: SHIPPED | OUTPATIENT
Start: 2018-01-15 | End: 2018-07-17 | Stop reason: SDUPTHER

## 2018-02-06 ENCOUNTER — OFFICE VISIT (OUTPATIENT)
Dept: INTERNAL MEDICINE CLINIC | Age: 68
End: 2018-02-06

## 2018-02-06 VITALS
RESPIRATION RATE: 17 BRPM | SYSTOLIC BLOOD PRESSURE: 172 MMHG | OXYGEN SATURATION: 98 % | DIASTOLIC BLOOD PRESSURE: 84 MMHG | BODY MASS INDEX: 25.79 KG/M2 | WEIGHT: 164.3 LBS | HEART RATE: 75 BPM | TEMPERATURE: 98.2 F | HEIGHT: 67 IN

## 2018-02-06 DIAGNOSIS — E11.9 TYPE 2 DIABETES MELLITUS WITHOUT COMPLICATION, WITHOUT LONG-TERM CURRENT USE OF INSULIN (HCC): Primary | Chronic | ICD-10-CM

## 2018-02-06 DIAGNOSIS — Z12.11 COLON CANCER SCREENING: ICD-10-CM

## 2018-02-06 PROBLEM — E11.21 TYPE 2 DIABETES MELLITUS WITH NEPHROPATHY (HCC): Status: ACTIVE | Noted: 2018-02-06

## 2018-02-06 RX ORDER — METFORMIN HYDROCHLORIDE 500 MG/1
500 TABLET, EXTENDED RELEASE ORAL
Qty: 90 TAB | Refills: 1 | Status: SHIPPED | OUTPATIENT
Start: 2018-02-06 | End: 2018-02-07 | Stop reason: SDUPTHER

## 2018-02-06 NOTE — MR AVS SNAPSHOT
[address] [place] [address] [place] [place] [address] [address] 
[phone] Patient: Cline Cabot MRN: [id number] OIM:[id number]/68/[id number] Visit Information Date & Time Provider Department Dept. Phone Encounter #  
 2/6/2018 10:15 AM Ann Andre MD [facility] and [address] [id number] Follow-up Instructions Return in about 3 months (around 5/6/2018) for Diabetes Check. Follow-up and Disposition History Upcoming Health Maintenance Date Due FOBT Q 1 YEAR AGE 50-75 4/28/2000 ZOSTER VACCINE AGE 60> 2/28/2010 DTaP/Tdap/Td series (1 - Tdap) 6/17/2010 EYE EXAM RETINAL OR DILATED Q1 10/4/2017 MICROALBUMIN Q1 12/15/2017 MEDICARE YEARLY EXAM 3/16/2018 HEMOGLOBIN A1C Q6M 3/27/2018 Pneumococcal 65+ Low/Medium Risk (2 of 2 - PPSV23) 6/16/2018 FOOT EXAM Q1 7/31/2018 LIPID PANEL Q1 9/27/2018 GLAUCOMA SCREENING Q2Y 10/4/2018 Allergies as of 2/6/2018  Review Complete On: 2/6/2018 By: Carlito Luz LPN No Known Allergies Current Immunizations  Reviewed on 12/27/2016 Name Date Hep A Vaccine 6/16/2013 Hep B Vaccine 6/16/2013 Hep C Vaccine 6/16/2013 Influenza High Dose Vaccine PF 9/1/2017 Influenza Vaccine 9/16/2014 Influenza Vaccine Whole 10/30/2011 Pneumococcal Vaccine (Unspecified Type) 6/16/2013 Td 6/16/2010 ZZZ-RETIRED (DO NOT USE) Pneumococcal Vaccine (Unspecified Type) 2/2/2012  1:46 PM  
  
 Not reviewed this visit You Were Diagnosed With   
  
 Codes Comments Type 2 diabetes mellitus without complication, without long-term current use of insulin (HCC)    -  Primary ICD-10-CM: E11.9 ICD-9-CM: 250.00 Colon cancer screening     ICD-10-CM: Z12.11 ICD-9-CM: V76.51 Vitals BP Pulse Temp Resp Height(growth percentile) Weight(growth percentile)  172/84 (BP 1 Location: Left arm, BP Patient Position: Sitting) 75 98.2 °F (36.8 °C) (Oral) 17 5' 7\" (1.702 m) 164 lb 4.8 oz (74.5 kg) SpO2 BMI Smoking Status 98% 25.73 kg/m2 Former Smoker Vitals History BMI and BSA Data Body Mass Index Body Surface Area 25.73 kg/m 2 1.88 m 2 Preferred Pharmacy Pharmacy Name Phone [facility] [phone] Your Updated Medication List  
  
   
This list is accurate as of: 2/6/18 11:53 AM.  Always use your most recent med list.  
  
  
  
  
 aspirin delayed-release 81 mg tablet Take 81 mg by mouth daily. atorvastatin 20 mg tablet Commonly known as:  LIPITOR Take 1 Tab by mouth daily. furosemide 40 mg tablet Commonly known as:  LASIX Take 20-40 mg by mouth daily. glucose blood VI test strips strip Commonly known as:  blood glucose test  
Check blood sugar three times daily before meals. Yari contour next strips  Diag. 250.00  
  
 lisinopril 10 mg tablet Commonly known as:  Monica Fair Take  by mouth daily. magnesium oxide 400 mg tablet Commonly known as:  MAG-OX Take 1 Tab by mouth daily. metFORMIN  mg tablet Commonly known as:  GLUCOPHAGE XR Take 1 Tab by mouth daily (with dinner). metoprolol succinate 25 mg XL tablet Commonly known as:  TOPROL-XL Take 1 Tab by mouth daily. tamsulosin 0.4 mg capsule Commonly known as:  FLOMAX Take 1 Cap by mouth daily. Prescriptions Sent to Pharmacy Refills  
 metFORMIN ER (GLUCOPHAGE XR) 500 mg tablet 1 Sig: Take 1 Tab by mouth daily (with dinner). Class: Normal  
 Pharmacy: [place], [address] [facility] Ph #: [phone] Route: Oral  
  
We Performed the Following HEMOGLOBIN A1C WITH EAG [30140 CPT(R)] LIPID PANEL [88227 CPT(R)] METABOLIC PANEL, COMPREHENSIVE [60089 CPT(R)] MICROALBUMIN, UR, RAND W/ MICROALBUMIN/CREA RATIO [id number] CPT(R)] OCCULT BLOOD, IMMUNOASSAY (FIT) [id number] CPT(R)] Follow-up Instructions Return in about 3 months (around 5/6/2018) for Diabetes Check. Introducing [facility] & [facility] SERVICES! Dear Narinder Sanford: 
Thank you for requesting a [employer] account. Our records indicate that you already have an active [employer] account. You can access your account anytime at [email or web address] Did you know that you can access your hospital and ER discharge instructions at any time in [employer]? You can also review all of your test results from your hospital stay or ER visit. Additional Information If you have questions, please visit the Frequently Asked Questions section of the [employer] website at [email or web address] Remember, [employer] is NOT to be used for urgent needs. For medical emergencies, dial 911. Now available from your iPhone and Android! Please provide this summary of care documentation to your next provider. Your primary care clinician is listed as Edinson Mann. If you have any questions after today's visit, please call [phone].

## 2018-02-06 NOTE — PROGRESS NOTES
Chief Complaint   Patient presents with    Diabetes    Hypertension     he is a 79y.o. year old male who presents for follow-up of diabetes, HTN    Diabetes - Pt well controlled on metformin. is checking BS at home. denies hypoglycemia symptoms. denies neuropathy symptoms. Last eye exam Late 2017. Last foot exam Unknown. Questionaire:  Diabetes Report Card   1) Have you seen the eye doctor in past year?yes    2) How would you  rate your Diabetic Diet? Excellent   3) How well do you take care of your feet? Well   4) Do you keep your Primary Care Follow Up Appts? yes    5) Do you know your A1C goal?yes    6) Do you take your medications daily? yes    7) Do you check your blood sugars? yes    8) Have you gained weight?yes    9) Have you lost weight?no    10) Do you follow an exercise program?no    11) Can you do better? yes            Lab Results   Component Value Date/Time    Hemoglobin A1c 6.8 09/27/2017 11:12 AM     Lab Results   Component Value Date/Time    Cholesterol, total 130 09/27/2017 11:12 AM    HDL Cholesterol 46 09/27/2017 11:12 AM    LDL, calculated 67 09/27/2017 11:12 AM    Triglyceride 83 09/27/2017 11:12 AM    CHOL/HDL Ratio 2.3 05/09/2016 03:40 AM     Lab Results   Component Value Date/Time    Microalb/Creat ratio (ug/mg creat.) 229.0 12/15/2016 10:31 AM         Reviewed and agree with Nurse Note and duplicated in this note. Reviewed PmHx, RxHx, FmHx, SocHx, AllgHx and updated and dated in the chart. No family history on file.     Past Medical History:   Diagnosis Date    Diabetes ([facility] [address] [facility]     Heart failure ([facility] [address] [facility]     Hypertension     Type II or unspecified type diabetes mellitus without mention of complication, not stated as uncontrolled     Wears hearing aid 4/2015      Social History     Social History    Marital status: SINGLE     Spouse name: N/A    Number of children: N/A    Years of education: N/A     Social History Main Topics    Smoking status: Former Smoker    Smokeless tobacco: Never Used    Alcohol use 10.5 oz/week     21 Glasses of wine per week    Drug use: None    Sexual activity: Not Asked     Other Topics Concern    None     Social History Narrative        Review of Systems - negative except as listed above      Objective:     Vitals:    02/06/18 1027   BP: 172/84   Pulse: 75   Resp: 17   Temp: 98.2 °F (36.8 °C)   TempSrc: Oral   SpO2: 98%   Weight: 164 lb 4.8 oz (74.5 kg)   Height: 5' 7\" (1.702 m)       Physical Examination: General appearance - alert, well appearing, and in no distress  Eyes - pupils equal and reactive, extraocular eye movements intact  Ears - bilateral TM's and external ear canals normal  Nose - normal and patent, no erythema, discharge or polyps  Mouth - mucous membranes moist, pharynx normal without lesions  Neck - supple, no significant adenopathy  Chest - clear to auscultation, no wheezes, rales or rhonchi, symmetric air entry  Heart - normal rate, regular rhythm, normal S1, S2, no murmurs, rubs, clicks or gallops  Abdomen - soft, nontender, nondistended, no masses or organomegaly  Neurological - alert, oriented, normal speech, no focal findings or movement disorder noted  Musculoskeletal - no joint tenderness, deformity or swelling  Extremities - peripheral pulses normal, no pedal edema, no clubbing or cyanosis  Skin - normal coloration and turgor, no rashes, no suspicious skin lesions noted    Assessment/ Plan:   Diagnoses and all orders for this visit:    1. Type 2 diabetes mellitus without complication, without long-term current use of insulin (HCC)  -     MICROALBUMIN, UR, RAND W/ MICROALBUMIN/CREA RATIO  -     HEMOGLOBIN A1C WITH EAG  -     METABOLIC PANEL, COMPREHENSIVE  -     LIPID PANEL    2. Colon cancer screening  -     OCCULT BLOOD, IMMUNOASSAY (FIT)    Other orders  -     metFORMIN ER (GLUCOPHAGE XR) 500 mg tablet; Take 1 Tab by mouth daily (with dinner).      Follow-up Disposition: Not on File    1) Remember to stay active and/or exercise regularly (I suggest 30-45 minutes daily)   2) For reliable dietary information, go to www. EATRIGHT.org. You may wish to consider seeing the nutritionist at [facility] [phone], also consider the [address] 3) I routinely suggest a complete physical exam once each year (your birth month)  I have discussed the diagnosis with the patient and the intended plan as seen in the above orders. The patient has received an after-visit summary and questions were answered concerning future plans. Medication Side Effects and Warnings were discussed with patient: yes  Patient Labs were reviewed and or requested: yes  Patient Past Records were reviewed and or requested  yes  I have discussed the diagnosis with the patient and the intended plan as seen in the above orders. The patient has received an after-visit summary and questions were answered concerning future plans. Pt agrees to call or return to clinic and/or go to closest ER with any worsening of symptoms. This may include, but not limited to increased fever (>100.4) with NSAIDS or Tylenol, increased edema, confusion, rash, worsening of presenting symptoms.

## 2018-02-06 NOTE — LETTER
NOTIFICATION RETURN TO WORK / SCHOOL 
 
2/6/2018 10:52 AM 
 
Mr. Gely Garcia [address] [facility] [place] [facility] [id number] To Whom It May Concern: Gely Garcia is currently under the care of Kelly Zavala. He is under the care for a left arm injury. For the next 2-4 weeks he will have limited range of motion of the left arm. Following accommodations would be helpful work: 
No lifting greater than 5 pounds with left arm, and no overhead lifting with left arm If there are questions or concerns please have the patient contact our office. Sincerely, Margarita Hernandez MD

## 2018-02-07 ENCOUNTER — TELEPHONE (OUTPATIENT)
Dept: INTERNAL MEDICINE CLINIC | Age: 68
End: 2018-02-07

## 2018-02-07 LAB
ALBUMIN SERPL-MCNC: 4.1 G/DL (ref 3.6–4.8)
ALBUMIN/CREAT UR: 218.3 MG/G CREAT (ref 0–30)
ALBUMIN/GLOB SERPL: 1.3 {RATIO} (ref 1.2–2.2)
ALP SERPL-CCNC: 108 IU/L (ref 39–117)
ALT SERPL-CCNC: 11 IU/L (ref 0–44)
AST SERPL-CCNC: 14 IU/L (ref 0–40)
BILIRUB SERPL-MCNC: 0.4 MG/DL (ref 0–1.2)
BUN SERPL-MCNC: 24 MG/DL (ref 8–27)
BUN/CREAT SERPL: 19 (ref 10–24)
CALCIUM SERPL-MCNC: 9.2 MG/DL (ref 8.6–10.2)
CHLORIDE SERPL-SCNC: 99 MMOL/L (ref 96–106)
CHOLEST SERPL-MCNC: 141 MG/DL (ref 100–199)
CO2 SERPL-SCNC: 29 MMOL/L (ref 18–29)
CREAT SERPL-MCNC: 1.27 MG/DL (ref 0.76–1.27)
CREAT UR-MCNC: 22.9 MG/DL
EST. AVERAGE GLUCOSE BLD GHB EST-MCNC: 166 MG/DL
GFR SERPLBLD CREATININE-BSD FMLA CKD-EPI: 58 ML/MIN/1.73
GFR SERPLBLD CREATININE-BSD FMLA CKD-EPI: 67 ML/MIN/1.73
GLOBULIN SER CALC-MCNC: 3.1 G/DL (ref 1.5–4.5)
GLUCOSE SERPL-MCNC: 143 MG/DL (ref 65–99)
HBA1C MFR BLD: 7.4 % (ref 4.8–5.6)
HDLC SERPL-MCNC: 50 MG/DL
LDLC SERPL CALC-MCNC: 73 MG/DL (ref 0–99)
MICROALBUMIN UR-MCNC: 50 UG/ML
POTASSIUM SERPL-SCNC: 4.4 MMOL/L (ref 3.5–5.2)
PROT SERPL-MCNC: 7.2 G/DL (ref 6–8.5)
SODIUM SERPL-SCNC: 142 MMOL/L (ref 134–144)
TRIGL SERPL-MCNC: 91 MG/DL (ref 0–149)
VLDLC SERPL CALC-MCNC: 18 MG/DL (ref 5–40)

## 2018-02-07 RX ORDER — METFORMIN HYDROCHLORIDE 500 MG/1
500 TABLET, EXTENDED RELEASE ORAL 2 TIMES DAILY
Qty: 180 TAB | Refills: 0 | Status: SHIPPED | OUTPATIENT
Start: 2018-02-07 | End: 2018-05-08

## 2018-02-07 NOTE — TELEPHONE ENCOUNTER
I attempted to call patient but was unable to leave a  VM. I was trying to let him know lab results. Will send a letter.

## 2018-03-13 DIAGNOSIS — I10 ESSENTIAL HYPERTENSION: ICD-10-CM

## 2018-03-13 RX ORDER — METOPROLOL SUCCINATE 25 MG/1
25 TABLET, EXTENDED RELEASE ORAL DAILY
Qty: 30 TAB | Refills: 5 | Status: SHIPPED | OUTPATIENT
Start: 2018-03-13 | End: 2018-07-17 | Stop reason: SDUPTHER

## 2018-03-13 NOTE — TELEPHONE ENCOUNTER
Pharmacy Note:  Patient is requesting a refill  for the prescription indicated; however, there are no authorization refills remaining. If you would like to authorize additional refills. Pharmacy on file verified. Pharmacist call back # [phone]  Fax # [phone]  . Requested Prescriptions     Pending Prescriptions Disp Refills    metoprolol succinate (TOPROL-XL) 25 mg XL tablet 30 Tab 6     Sig: Take 1 Tab by mouth daily.

## 2018-07-17 DIAGNOSIS — I10 ESSENTIAL HYPERTENSION: ICD-10-CM

## 2018-07-17 RX ORDER — TAMSULOSIN HYDROCHLORIDE 0.4 MG/1
0.4 CAPSULE ORAL DAILY
Qty: 30 CAP | Refills: 5 | Status: SHIPPED | OUTPATIENT
Start: 2018-07-17

## 2018-07-17 RX ORDER — METOPROLOL SUCCINATE 25 MG/1
25 TABLET, EXTENDED RELEASE ORAL DAILY
Qty: 30 TAB | Refills: 5 | Status: SHIPPED | OUTPATIENT
Start: 2018-07-17

## 2018-07-17 RX ORDER — LANOLIN ALCOHOL/MO/W.PET/CERES
400 CREAM (GRAM) TOPICAL DAILY
Qty: 30 TAB | Refills: 5 | Status: SHIPPED | OUTPATIENT
Start: 2018-07-17

## 2018-09-04 ENCOUNTER — OFFICE VISIT (OUTPATIENT)
Dept: INTERNAL MEDICINE CLINIC | Age: 68
End: 2018-09-04

## 2018-09-04 VITALS
SYSTOLIC BLOOD PRESSURE: 176 MMHG | BODY MASS INDEX: 24.01 KG/M2 | OXYGEN SATURATION: 100 % | RESPIRATION RATE: 16 BRPM | WEIGHT: 153 LBS | DIASTOLIC BLOOD PRESSURE: 94 MMHG | HEIGHT: 67 IN | TEMPERATURE: 98.1 F | HEART RATE: 75 BPM

## 2018-09-04 DIAGNOSIS — E11.21 TYPE 2 DIABETES MELLITUS WITH NEPHROPATHY (HCC): ICD-10-CM

## 2018-09-04 DIAGNOSIS — Z13.31 SCREENING FOR DEPRESSION: ICD-10-CM

## 2018-09-04 DIAGNOSIS — Z23 ENCOUNTER FOR IMMUNIZATION: ICD-10-CM

## 2018-09-04 DIAGNOSIS — Z13.6 SCREENING FOR ISCHEMIC HEART DISEASE: ICD-10-CM

## 2018-09-04 DIAGNOSIS — Z00.00 MEDICARE ANNUAL WELLNESS VISIT, SUBSEQUENT: Primary | ICD-10-CM

## 2018-09-04 DIAGNOSIS — Z12.5 SPECIAL SCREENING FOR MALIGNANT NEOPLASM OF PROSTATE: ICD-10-CM

## 2018-09-04 DIAGNOSIS — Z13.1 SCREENING FOR DIABETES MELLITUS: ICD-10-CM

## 2018-09-04 DIAGNOSIS — Z13.39 SCREENING FOR ALCOHOLISM: ICD-10-CM

## 2018-09-04 DIAGNOSIS — Z12.11 SCREEN FOR COLON CANCER: ICD-10-CM

## 2018-09-04 RX ORDER — METFORMIN HYDROCHLORIDE 500 MG/1
TABLET ORAL
COMMUNITY
End: 2018-10-04 | Stop reason: SDUPTHER

## 2018-09-04 NOTE — PROGRESS NOTES
This is the Subsequent Medicare Annual Wellness Exam, performed 12 months or more after the Initial AWV or the last Subsequent AWV I have reviewed the patient's medical history in detail and updated the computerized patient record. History Past Medical History:  
Diagnosis Date  Diabetes ([facility] [address]  Heart failure ([facility] [address]  Hypertension  Type II or unspecified type diabetes mellitus without mention of complication, not stated as uncontrolled  Wears hearing aid 4/2015 Past Surgical History:  
Procedure Laterality Date  HX PACEMAKER Current Outpatient Prescriptions Medication Sig Dispense Refill  metFORMIN (GLUCOPHAGE) 500 mg tablet Take  by mouth daily (with dinner).  magnesium oxide (MAG-OX) 400 mg tablet Take 1 Tab by mouth daily. 30 Tab 5  tamsulosin (FLOMAX) 0.4 mg capsule Take 1 Cap by mouth daily. 30 Cap 5  
 metoprolol succinate (TOPROL-XL) 25 mg XL tablet Take 1 Tab by mouth daily. 30 Tab 5  
 atorvastatin (LIPITOR) 20 mg tablet Take 1 Tab by mouth daily. 30 Tab 6  
 lisinopril (PRINIVIL, ZESTRIL) 10 mg tablet Take  by mouth daily.  glucose blood VI test strips (BLOOD GLUCOSE TEST) strip Check blood sugar three times daily before meals. Yari contour next strips  Diag. 250.00 100 Strip 6  aspirin delayed-release 81 mg tablet Take 81 mg by mouth daily.  furosemide (LASIX) 40 mg tablet Take 20-40 mg by mouth daily. No Known Allergies History reviewed. No pertinent family history. Social History Substance Use Topics  Smoking status: Former Smoker  Smokeless tobacco: Never Used  Alcohol use 10.5 oz/week 21 Glasses of wine per week Patient Active Problem List  
Diagnosis Code  MI ANT/LAT,FIRST EPISODE CARE I21.09  
 Type II or unspecified type diabetes mellitus without mention of complication, not stated as uncontrolled E11.9  Hyperlipidemia E78.5  Unspecified essential hypertension I10  
  Ischemic cardiomyopathy I25.5  GIFTY (acute kidney injury) ([facility] [address] N17.9  Urinary retention R33.9  Volume depletion E86.9  Anemia D64.9  DM type 2 (diabetes mellitus, type 2) (HCC) E11.9  
 Essential hypertension I10  
 ACP (advance care planning) Z71.89  
 Hypoglycemia E16.2  Type 2 diabetes mellitus with nephropathy (HCC) E11.21 Depression Risk Factor Screening: PHQ over the last two weeks 2/6/2018 Little interest or pleasure in doing things Not at all Feeling down, depressed, irritable, or hopeless Not at all Total Score PHQ 2 0 Alcohol Risk Factor Screening: You do not drink alcohol or very rarely. Functional Ability and Level of Safety:  
Hearing Loss Hearing is good. Activities of Daily Living The home contains: handrails Patient does total self care Fall Risk Fall Risk Assessment, last 12 mths 2/6/2018 Able to walk? Yes Fall in past 12 months? No  
 
 
Abuse Screen Patient is not abused Cognitive Screening Evaluation of Cognitive Function: 
Has your family/caregiver stated any concerns about your memory: no 
Normal 
 
Patient Care Team  
Patient Care Team: 
Sandie Stuart MD as PCP - General (Family Practice) Rashida Brown MD (Family Practice) Elda Townsend LPN as Nurse Navigator Patient foot exam within normal limits Assessment/Plan Education and counseling provided: 
Are appropriate based on today's review and evaluation Diagnoses and all orders for this visit: 1. Screening for alcoholism -     Annual  Alcohol Screen 15 min () 2. Screening for depression -     Depression Screen Annual 
 
3. Screen for colon cancer Health Maintenance Due Topic Date Due  
 FOBT Q 1 YEAR AGE 50-75  04/28/2000  ZOSTER VACCINE AGE 60>  02/28/2010  
 DTaP/Tdap/Td series (1 - Tdap) 06/17/2010  
 EYE EXAM RETINAL OR DILATED Q1  10/04/2017  MEDICARE YEARLY EXAM  03/16/2018  Pneumococcal 65+ Low/Medium Risk (2 of 2 - PPSV23) 06/16/2018  
 FOOT EXAM Q1  07/31/2018  Influenza Age 5 to Adult  08/01/2018  HEMOGLOBIN A1C Q6M  08/06/2018  GLAUCOMA SCREENING Q2Y  10/04/2018

## 2018-09-04 NOTE — ASSESSMENT & PLAN NOTE
Stable, based on history, physical exam and review of pertinent labs, studies and medications; meds reconciled; continue current treatment plan. Key Antihyperglycemic Medications   
    
  
 metFORMIN (GLUCOPHAGE) 500 mg tablet  (Taking) Take  by mouth daily (with dinner). Other Key Diabetic Medications   
    
  
 atorvastatin (LIPITOR) 20 mg tablet  (Taking) Take 1 Tab by mouth daily. lisinopril (PRINIVIL, ZESTRIL) 10 mg tablet  (Taking) Take  by mouth daily. Lab Results Component Value Date/Time Hemoglobin A1c 7.4 02/06/2018 11:40 AM  
 Glucose 143 02/06/2018 11:40 AM  
 Creatinine 1.27 02/06/2018 11:40 AM  
 Microalb/Creat ratio (ug/mg creat.) 218.3 02/06/2018 11:40 AM  
 Cholesterol, total 141 02/06/2018 11:40 AM  
 HDL Cholesterol 50 02/06/2018 11:40 AM  
 LDL, calculated 73 02/06/2018 11:40 AM  
 Triglyceride 91 02/06/2018 11:40 AM  
 
Diabetic Foot and Eye Exam  Status Topic Date Due [facility] Eye Exam  10/04/2017 [facility] Diabetic Foot Care  07/31/2018

## 2018-09-04 NOTE — ACP (ADVANCE CARE PLANNING)
Advance Care Planning (ACP) Provider Conversation Snapshot    Date of ACP Conversation: 09/04/18  Persons included in Conversation:  patient  Length of ACP Conversation in minutes:  <16 minutes (Non-Billable)    Authorized Decision Maker (if patient is incapable of making informed decisions): This person is:   Healthcare Agent/Medical Power of  under Advance Directive          For Patients with Decision Making Capacity:   Values/Goals: Exploration of values, goals, and preferences if recovery is not expected, even with continued medical treatment in the event of:  Imminent death  Severe, permanent brain injury  \"In these circumstances, what matters most to you? \"  Care focused more on comfort or quality of life.     Conversation Outcomes / Follow-Up Plan:   Recommended completion of Advance Directive form after review of ACP materials and conversation with prospective healthcare agent

## 2018-09-04 NOTE — ASSESSMENT & PLAN NOTE
Stable, based on history, physical exam and review of pertinent labs, studies and medications; meds reconciled; continue current treatment plan. Key CKD Meds   
    
  
 tamsulosin (FLOMAX) 0.4 mg capsule  (Taking) Take 1 Cap by mouth daily. lisinopril (PRINIVIL, ZESTRIL) 10 mg tablet  (Taking) Take  by mouth daily. furosemide (LASIX) 40 mg tablet  (Taking) Take 20-40 mg by mouth daily. Lab Results Component Value Date/Time GFR est non-AA 58 02/06/2018 11:40 AM  
 GFR est AA 67 02/06/2018 11:40 AM  
 Creatinine 1.27 02/06/2018 11:40 AM  
 BUN 24 02/06/2018 11:40 AM  
 Sodium 142 02/06/2018 11:40 AM  
 Potassium 4.4 02/06/2018 11:40 AM  
 Chloride 99 02/06/2018 11:40 AM  
 CO2 29 02/06/2018 11:40 AM  
 Calcium 9.2 02/06/2018 11:40 AM  
 
CrCl cannot be calculated (Patient's most recent sCr result is older than the maximum 180 days allowed. ).

## 2018-09-04 NOTE — PATIENT INSTRUCTIONS
Medicare Wellness Visit, Male The best way to live healthy is to have a lifestyle where you eat a well-balanced diet, exercise regularly, limit alcohol use, and quit all forms of tobacco/nicotine, if applicable. Regular preventive services are another way to keep healthy. Preventive services (vaccines, screening tests, monitoring & exams) can help personalize your care plan, which helps you manage your own care. Screening tests can find health problems at the earliest stages, when they are easiest to treat. [address] [facility] follows the current, evidence-based guidelines published by the [facility] Claudio Campbell ([facility]) when recommending preventive services for our patients. Because we follow these guidelines, sometimes recommendations change over time as research supports it. (For example, a prostate screening blood test is no longer routinely recommended for men with no symptoms.) Of course, you and your doctor may decide to screen more often for some diseases, based on your risk and co-morbidities (chronic disease you are already diagnosed with). Preventive services for you include: - Medicare offers their members a free annual wellness visit, which is time for you and your primary care provider to discuss and plan for your preventive service needs. Take advantage of this benefit every year! 
-All adults over age 72 should receive the recommended pneumonia vaccines. Current USPSTF guidelines recommend a series of two vaccines for the best pneumonia protection.  
-All adults should have a flu vaccine yearly and an ECG.  All adults age 61 and older should receive a shingles vaccine once in their lifetime.   
-All adults age 38-68 who are overweight should have a diabetes screening test once every three years.  
-Other screening tests & preventive services for persons with diabetes include: an eye exam to screen for diabetic retinopathy, a kidney function test, a foot exam, and stricter control over your cholesterol.  
-Cardiovascular screening for adults with routine risk involves an electrocardiogram (ECG) at intervals determined by the provider.  
-Colorectal cancer screening should be done for adults age 54-65 with no increased risk factors for colorectal cancer. There are a number of acceptable methods of screening for this type of cancer. Each test has its own benefits and drawbacks. Discuss with your provider what is most appropriate for you during your annual wellness visit. The different tests include: colonoscopy (considered the best screening method), a fecal occult blood test, a fecal DNA test, and sigmoidoscopy. 
-All adults born between [facility] should be screened once for Hepatitis C. 
-An Abdominal Aortic Aneurysm (AAA) Screening is recommended for men age 73-68 who has ever smoked in their lifetime. Here is a list of your current Health Maintenance items (your personalized list of preventive services) with a due date: 
Health Maintenance Due Topic Date Due  Stool testing for trace blood  04/28/2000  Shingles Vaccine  02/28/2010  
 DTaP/Tdap/Td  (1 - Tdap) 06/17/2010 [address] [place] [address] Eye Exam  10/04/2017 [address] [place] [address] Annual Well Visit  03/16/2018  Pneumococcal Vaccine (2 of 2 - PPSV23) 06/16/2018 [address] [place] [address] Diabetic Foot Care  07/31/2018  Flu Vaccine  08/01/2018  Hemoglobin A1C    08/06/2018  Glaucoma Screening   10/04/2018 Medicare Wellness Visit, Male The best way to live healthy is to have a lifestyle where you eat a well-balanced diet, exercise regularly, limit alcohol use, and quit all forms of tobacco/nicotine, if applicable. Regular preventive services are another way to keep healthy. Preventive services (vaccines, screening tests, monitoring & exams) can help personalize your care plan, which helps you manage your own care. Screening tests can find health problems at the earliest stages, when they are easiest to treat. [facility] follows the current, evidence-based guidelines published by the [facility] Claudio Hightower ([facility]) when recommending preventive services for our patients. Because we follow these guidelines, sometimes recommendations change over time as research supports it. (For example, a prostate screening blood test is no longer routinely recommended for men with no symptoms.) Of course, you and your doctor may decide to screen more often for some diseases, based on your risk and co-morbidities (chronic disease you are already diagnosed with). Preventive services for you include: - Medicare offers their members a free annual wellness visit, which is time for you and your primary care provider to discuss and plan for your preventive service needs. Take advantage of this benefit every year! 
-All adults over age 72 should receive the recommended pneumonia vaccines. Current USPSTF guidelines recommend a series of two vaccines for the best pneumonia protection.  
-All adults should have a flu vaccine yearly and an ECG. All adults age 61 and older should receive a shingles vaccine once in their lifetime.   
-All adults age 38-68 who are overweight should have a diabetes screening test once every three years.  
-Other screening tests & preventive services for persons with diabetes include: an eye exam to screen for diabetic retinopathy, a kidney function test, a foot exam, and stricter control over your cholesterol.  
-Cardiovascular screening for adults with routine risk involves an electrocardiogram (ECG) at intervals determined by the provider.  
-Colorectal cancer screening should be done for adults age 54-65 with no increased risk factors for colorectal cancer. There are a number of acceptable methods of screening for this type of cancer. Each test has its own benefits and drawbacks.  Discuss with your provider what is most appropriate for you during your annual wellness visit. The different tests include: colonoscopy (considered the best screening method), a fecal occult blood test, a fecal DNA test, and sigmoidoscopy. 
-All adults born between [facility] should be screened once for Hepatitis C. 
-An Abdominal Aortic Aneurysm (AAA) Screening is recommended for men age 73-68 who has ever smoked in their lifetime. Here is a list of your current Health Maintenance items (your personalized list of preventive services) with a due date: 
Health Maintenance Due Topic Date Due  Stool testing for trace blood  04/28/2000  Shingles Vaccine  02/28/2010 [facility] Eye Exam  10/04/2017 [facility] Annual Well Visit  03/16/2018  Pneumococcal Vaccine (2 of 2 - PPSV23) 06/16/2018 [facility] Diabetic Foot Care  07/31/2018  Flu Vaccine  08/01/2018  Hemoglobin A1C    08/06/2018  Glaucoma Screening   10/04/2018

## 2018-09-04 NOTE — MR AVS SNAPSHOT
Edmond Paris 
 
 
 [facility] [address] [address] 
[phone] Patient: Ebony Pérez MRN: [id number] Visit Information Date & Time Provider Department Dept. Phone Encounter #  
 9/4/2018 10:00 AM 2400 [facility] Vandana Vaughn [facility] Sports Medicine and [address] [id number] Upcoming Health Maintenance Date Due FOBT Q 1 YEAR AGE 50-75 4/28/2000 ZOSTER VACCINE AGE 60> 2/28/2010 EYE EXAM RETINAL OR DILATED Q1 10/4/2017 MEDICARE YEARLY EXAM 3/16/2018 Pneumococcal 65+ Low/Medium Risk (2 of 2 - PPSV23) 6/16/2018 FOOT EXAM Q1 7/31/2018 Influenza Age 5 to Adult 8/1/2018 HEMOGLOBIN A1C Q6M 8/6/2018 GLAUCOMA SCREENING Q2Y 10/4/2018 DTaP/Tdap/Td series (1 - Tdap) 9/4/2019* MICROALBUMIN Q1 2/6/2019 LIPID PANEL Q1 2/6/2019 *Topic was postponed. The date shown is not the original due date. Allergies as of 9/4/2018  Review Complete On: 9/4/2018 By: Mignon Verdugo No Known Allergies Current Immunizations  Reviewed on 12/27/2016 Name Date Hep A Vaccine 6/16/2013 Hep B Vaccine 6/16/2013 Hep C Vaccine 6/16/2013 Influenza High Dose Vaccine PF  Incomplete, 9/1/2017 Influenza Vaccine 9/16/2014 Influenza Vaccine Whole 10/30/2011 Pneumococcal Vaccine (Unspecified Type) 6/16/2013 Td 6/16/2010 ZZZ-RETIRED (DO NOT USE) Pneumococcal Vaccine (Unspecified Type) 2/2/2012  1:46 PM  
  
 Not reviewed this visit You Were Diagnosed With   
  
 Codes Comments Medicare annual wellness visit, subsequent    -  Primary ICD-10-CM: Z00.00 ICD-9-CM: V70.0 Screening for alcoholism     ICD-10-CM: Z13.89 ICD-9-CM: V79.1 Screening for depression     ICD-10-CM: Z13.89 ICD-9-CM: V79.0 Screen for colon cancer     ICD-10-CM: Z12.11 ICD-9-CM: V76.51  Type 2 diabetes mellitus with nephropathy (HCC)     ICD-10-CM: E11.21 
ICD-9-CM: 250.40, 583.81   
 Encounter for immunization     ICD-10-CM: U73 ICD-9-CM: V03.89 Screening for diabetes mellitus     ICD-10-CM: Z13.1 ICD-9-CM: V77.1 Screening for ischemic heart disease     ICD-10-CM: Z13.6 ICD-9-CM: V81.0 Special screening for malignant neoplasm of prostate     ICD-10-CM: Z12.5 ICD-9-CM: V76.44 Vitals BP Pulse Temp Resp Height(growth percentile) Weight(growth percentile) (!) 176/94 (BP 1 Location: Right arm, BP Patient Position: Sitting) 75 98.1 °F (36.7 °C) (Oral) 16 5' 7\" (1.702 m) 153 lb (69.4 kg) SpO2 BMI Smoking Status 100% 23.96 kg/m2 Former Smoker Vitals History BMI and BSA Data Body Mass Index Body Surface Area  
 23.96 kg/m 2 1.81 m 2 Preferred Pharmacy Pharmacy Name Phone [facility] [phone] Your Updated Medication List  
  
   
This list is accurate as of 9/4/18 11:15 AM.  Always use your most recent med list.  
  
  
  
  
 aspirin delayed-release 81 mg tablet Take 81 mg by mouth daily. atorvastatin 20 mg tablet Commonly known as:  LIPITOR Take 1 Tab by mouth daily. furosemide 40 mg tablet Commonly known as:  LASIX Take 20-40 mg by mouth daily. glucose blood VI test strips strip Commonly known as:  blood glucose test  
Check blood sugar three times daily before meals. Yari contour next strips  Diag. 250.00  
  
 lisinopril 10 mg tablet Commonly known as:  Nonah Tiffany Take  by mouth daily. magnesium oxide 400 mg tablet Commonly known as:  MAG-OX Take 1 Tab by mouth daily. metFORMIN 500 mg tablet Commonly known as:  GLUCOPHAGE Take  by mouth daily (with dinner). metoprolol succinate 25 mg XL tablet Commonly known as:  TOPROL-XL Take 1 Tab by mouth daily. tamsulosin 0.4 mg capsule Commonly known as:  FLOMAX Take 1 Cap by mouth daily. varicella zoster vaccine live 19,400 unit/0.65 mL Susr injection Commonly known as:  ZOSTAVAX  
1 Vial by SubCUTAneous route once for 1 dose. Prescriptions Printed Refills  
 varicella zoster vaccine live (ZOSTAVAX) 19,400 unit/0.65 mL susr injection 0 Si Vial by SubCUTAneous route once for 1 dose. Class: Print Route: SubCUTAneous We Performed the Following [address] [[id number] [facility]] GLUCOSE, RANDOM [id number] CPT(R)] HEMOGLOBIN A1C WITH EAG [27857 CPT(R)]  DIABETES FOOT EXAM [HM7 Custom] INFLUENZA VIRUS VACCINE, HIGH DOSE SEASONAL, PRESERVATIVE FREE [61278 CPT(R)] LIPID PANEL [72514 CPT(R)] OCCULT BLOOD IMMUNOASSAY,DIAGNOSTIC [81635 CPT(R)] KS ANNUAL ALCOHOL SCREEN 15 MIN [id number] [facility]] KS IMMUNIZ ADMIN,1 SINGLE/COMB VAC/TOXOID [id number] CPT(R)] PSA SCREENING (SCREENING) [ [facility]] REFERRAL TO OPHTHALMOLOGY [REF57 Custom] Referral Information Referral ID Referred By Referred To  
  
 [id number] Fantasma Lentz, [address] [place] [address], [facility] Visits Status Start Date End Date 1 New Request 18 If your referral has a status of pending review or denied, additional information will be sent to support the outcome of this decision. Patient Instructions Medicare Wellness Visit, Male The best way to live healthy is to have a lifestyle where you eat a well-balanced diet, exercise regularly, limit alcohol use, and quit all forms of tobacco/nicotine, if applicable. Regular preventive services are another way to keep healthy. Preventive services (vaccines, screening tests, monitoring & exams) can help personalize your care plan, which helps you manage your own care. Screening tests can find health problems at the earliest stages, when they are easiest to treat.   
[facility] follows the current, evidence-based guidelines published by the \[facility]\"" (USPSTF) when recommending preventive services for our patients. Because we follow these guidelines, sometimes recommendations change over time as research supports it. (For example, a prostate screening blood test is no longer routinely recommended for men with no symptoms.) Of course, you and your doctor may decide to screen more often for some diseases, based on your risk and co-morbidities (chronic disease you are already diagnosed with). Preventive services for you include: - Medicare offers their members a free annual wellness visit, which is time for you and your primary care provider to discuss and plan for your preventive service needs. Take advantage of this benefit every year! 
-All adults over age 72 should receive the recommended pneumonia vaccines. Current USPSTF guidelines recommend a series of two vaccines for the best pneumonia protection.  
-All adults should have a flu vaccine yearly and an ECG. All adults age 61 and older should receive a shingles vaccine once in their lifetime.   
-All adults age 38-68 who are overweight should have a diabetes screening test once every three years.  
-Other screening tests & preventive services for persons with diabetes include: an eye exam to screen for diabetic retinopathy, a kidney function test, a foot exam, and stricter control over your cholesterol.  
-Cardiovascular screening for adults with routine risk involves an electrocardiogram (ECG) at intervals determined by the provider.  
-Colorectal cancer screening should be done for adults age 54-65 with no increased risk factors for colorectal cancer. There are a number of acceptable methods of screening for this type of cancer. Each test has its own benefits and drawbacks. Discuss with your provider what is most appropriate for you during your annual wellness visit.  The different tests include: colonoscopy (considered the best screening method), a fecal occult blood test, a fecal DNA test, and sigmoidoscopy. 
-All adults born between [facility] should be screened once for Hepatitis C. 
-An Abdominal Aortic Aneurysm (AAA) Screening is recommended for men age 73-68 who has ever smoked in their lifetime. Here is a list of your current Health Maintenance items (your personalized list of preventive services) with a due date: 
Health Maintenance Due Topic Date Due  Stool testing for trace blood  04/28/2000  Shingles Vaccine  02/28/2010  
 DTaP/Tdap/Td  (1 - Tdap) 06/17/2010 Ranjit Eye Exam  10/04/2017 Ranjit Annual Well Visit  03/16/2018  Pneumococcal Vaccine (2 of 2 - PPSV23) 06/16/2018 Ranjit Diabetic Foot Care  07/31/2018  Flu Vaccine  08/01/2018  Hemoglobin A1C    08/06/2018  Glaucoma Screening   10/04/2018 Medicare Wellness Visit, Male The best way to live healthy is to have a lifestyle where you eat a well-balanced diet, exercise regularly, limit alcohol use, and quit all forms of tobacco/nicotine, if applicable. Regular preventive services are another way to keep healthy. Preventive services (vaccines, screening tests, monitoring & exams) can help personalize your care plan, which helps you manage your own care. Screening tests can find health problems at the earliest stages, when they are easiest to treat. [facility] follows the current, evidence-based guidelines published by the [facility] Claudio Campbell (USPSTF) when recommending preventive services for our patients. Because we follow these guidelines, sometimes recommendations change over time as research supports it. (For example, a prostate screening blood test is no longer routinely recommended for men with no symptoms.) Of course, you and your doctor may decide to screen more often for some diseases, based on your risk and co-morbidities (chronic disease you are already diagnosed with). Preventive services for you include: - Medicare offers their members a free annual wellness visit, which is time for you and your primary care provider to discuss and plan for your preventive service needs. Take advantage of this benefit every year! 
-All adults over age 72 should receive the recommended pneumonia vaccines. Current USPSTF guidelines recommend a series of two vaccines for the best pneumonia protection.  
-All adults should have a flu vaccine yearly and an ECG. All adults age 61 and older should receive a shingles vaccine once in their lifetime.   
-All adults age 38-68 who are overweight should have a diabetes screening test once every three years.  
-Other screening tests & preventive services for persons with diabetes include: an eye exam to screen for diabetic retinopathy, a kidney function test, a foot exam, and stricter control over your cholesterol.  
-Cardiovascular screening for adults with routine risk involves an electrocardiogram (ECG) at intervals determined by the provider.  
-Colorectal cancer screening should be done for adults age 54-65 with no increased risk factors for colorectal cancer. There are a number of acceptable methods of screening for this type of cancer. Each test has its own benefits and drawbacks. Discuss with your provider what is most appropriate for you during your annual wellness visit. The different tests include: colonoscopy (considered the best screening method), a fecal occult blood test, a fecal DNA test, and sigmoidoscopy. 
-All adults born between [facility] should be screened once for Hepatitis C. 
-An Abdominal Aortic Aneurysm (AAA) Screening is recommended for men age 73-68 who has ever smoked in their lifetime. Here is a list of your current Health Maintenance items (your personalized list of preventive services) with a due date: 
Health Maintenance Due Topic Date Due  Stool testing for trace blood  04/28/2000  Shingles Vaccine  02/28/2010 Romario Malcolm Eye Exam  10/04/2017 Romario Malcolm Annual Well Visit  03/16/2018  Pneumococcal Vaccine (2 of 2 - PPSV23) 06/16/2018 Romario Malcolm Diabetic Foot Care  07/31/2018  Flu Vaccine  08/01/2018  Hemoglobin A1C    08/06/2018  Glaucoma Screening   10/04/2018 Introducing [facility]! Dear Lizet Dong: 
Thank you for requesting a [employer] account. Our records indicate that you already have an active [employer] account. You can access your account anytime at [email or web address] Did you know that you can access your hospital and ER discharge instructions at any time in [employer]? You can also review all of your test results from your hospital stay or ER visit. Additional Information If you have questions, please visit the Frequently Asked Questions section of the [employer] website at [email or web address] Remember, [employer] is NOT to be used for urgent needs. For medical emergencies, dial 911. Now available from your iPhone and Android! Please provide this summary of care documentation to your next provider. Your primary care clinician is listed as Fozia Davis. If you have any questions after today's visit, please call [phone].

## 2018-09-05 LAB
CHOLEST SERPL-MCNC: 142 MG/DL (ref 100–199)
EST. AVERAGE GLUCOSE BLD GHB EST-MCNC: 148 MG/DL
GLUCOSE SERPL-MCNC: 146 MG/DL (ref 65–99)
HBA1C MFR BLD: 6.8 % (ref 4.8–5.6)
HDLC SERPL-MCNC: 48 MG/DL
LDLC SERPL CALC-MCNC: 78 MG/DL (ref 0–99)
TRIGL SERPL-MCNC: 79 MG/DL (ref 0–149)
VLDLC SERPL CALC-MCNC: 16 MG/DL (ref 5–40)

## 2018-10-04 RX ORDER — METFORMIN HYDROCHLORIDE 500 MG/1
500 TABLET ORAL
Qty: 90 TAB | Refills: 3 | Status: SHIPPED | OUTPATIENT
Start: 2018-10-04